# Patient Record
Sex: MALE | Race: WHITE | NOT HISPANIC OR LATINO | URBAN - METROPOLITAN AREA
[De-identification: names, ages, dates, MRNs, and addresses within clinical notes are randomized per-mention and may not be internally consistent; named-entity substitution may affect disease eponyms.]

---

## 2017-01-06 ENCOUNTER — OUTPATIENT (OUTPATIENT)
Dept: OUTPATIENT SERVICES | Facility: HOSPITAL | Age: 47
LOS: 1 days | End: 2017-01-06
Payer: MEDICAID

## 2017-01-06 ENCOUNTER — APPOINTMENT (OUTPATIENT)
Dept: INTERNAL MEDICINE | Facility: CLINIC | Age: 47
End: 2017-01-06

## 2017-01-06 VITALS
SYSTOLIC BLOOD PRESSURE: 96 MMHG | BODY MASS INDEX: 30.8 KG/M2 | WEIGHT: 220 LBS | HEIGHT: 71 IN | DIASTOLIC BLOOD PRESSURE: 70 MMHG

## 2017-01-06 DIAGNOSIS — I10 ESSENTIAL (PRIMARY) HYPERTENSION: ICD-10-CM

## 2017-01-06 DIAGNOSIS — Z98.89 OTHER SPECIFIED POSTPROCEDURAL STATES: Chronic | ICD-10-CM

## 2017-01-06 PROCEDURE — 90670 PCV13 VACCINE IM: CPT

## 2017-01-06 PROCEDURE — G0009: CPT

## 2017-01-06 PROCEDURE — G0463: CPT

## 2017-01-09 DIAGNOSIS — Z23 ENCOUNTER FOR IMMUNIZATION: ICD-10-CM

## 2017-01-09 DIAGNOSIS — Z00.00 ENCOUNTER FOR GENERAL ADULT MEDICAL EXAMINATION WITHOUT ABNORMAL FINDINGS: ICD-10-CM

## 2017-01-20 ENCOUNTER — APPOINTMENT (OUTPATIENT)
Dept: INTERNAL MEDICINE | Facility: CLINIC | Age: 47
End: 2017-01-20

## 2017-01-20 ENCOUNTER — OUTPATIENT (OUTPATIENT)
Dept: OUTPATIENT SERVICES | Facility: HOSPITAL | Age: 47
LOS: 1 days | End: 2017-01-20
Payer: MEDICAID

## 2017-01-20 DIAGNOSIS — Z98.89 OTHER SPECIFIED POSTPROCEDURAL STATES: Chronic | ICD-10-CM

## 2017-01-20 DIAGNOSIS — I10 ESSENTIAL (PRIMARY) HYPERTENSION: ICD-10-CM

## 2017-01-20 PROCEDURE — G0463: CPT

## 2017-01-23 DIAGNOSIS — F17.210 NICOTINE DEPENDENCE, CIGARETTES, UNCOMPLICATED: ICD-10-CM

## 2017-02-06 ENCOUNTER — APPOINTMENT (OUTPATIENT)
Dept: INTERNAL MEDICINE | Facility: CLINIC | Age: 47
End: 2017-02-06

## 2017-04-27 ENCOUNTER — OUTPATIENT (OUTPATIENT)
Dept: OUTPATIENT SERVICES | Facility: HOSPITAL | Age: 47
LOS: 1 days | End: 2017-04-27
Payer: MEDICAID

## 2017-04-27 ENCOUNTER — APPOINTMENT (OUTPATIENT)
Dept: INTERNAL MEDICINE | Facility: CLINIC | Age: 47
End: 2017-04-27

## 2017-04-27 VITALS
HEART RATE: 88 BPM | OXYGEN SATURATION: 95 % | BODY MASS INDEX: 30.24 KG/M2 | HEIGHT: 71 IN | SYSTOLIC BLOOD PRESSURE: 110 MMHG | WEIGHT: 216 LBS | DIASTOLIC BLOOD PRESSURE: 70 MMHG

## 2017-04-27 DIAGNOSIS — I10 ESSENTIAL (PRIMARY) HYPERTENSION: ICD-10-CM

## 2017-04-27 DIAGNOSIS — F41.8 OTHER SPECIFIED ANXIETY DISORDERS: ICD-10-CM

## 2017-04-27 DIAGNOSIS — Z98.89 OTHER SPECIFIED POSTPROCEDURAL STATES: Chronic | ICD-10-CM

## 2017-04-27 PROCEDURE — G0463: CPT

## 2017-05-09 DIAGNOSIS — F41.8 OTHER SPECIFIED ANXIETY DISORDERS: ICD-10-CM

## 2017-05-16 ENCOUNTER — APPOINTMENT (OUTPATIENT)
Dept: CARDIOLOGY | Facility: HOSPITAL | Age: 47
End: 2017-05-16

## 2017-05-16 ENCOUNTER — OUTPATIENT (OUTPATIENT)
Dept: OUTPATIENT SERVICES | Facility: HOSPITAL | Age: 47
LOS: 1 days | End: 2017-05-16
Payer: MEDICAID

## 2017-05-16 VITALS
SYSTOLIC BLOOD PRESSURE: 108 MMHG | HEIGHT: 71 IN | DIASTOLIC BLOOD PRESSURE: 71 MMHG | BODY MASS INDEX: 30.24 KG/M2 | WEIGHT: 216 LBS | HEART RATE: 76 BPM

## 2017-05-16 DIAGNOSIS — I25.10 ATHEROSCLEROTIC HEART DISEASE OF NATIVE CORONARY ARTERY WITHOUT ANGINA PECTORIS: ICD-10-CM

## 2017-05-16 DIAGNOSIS — Z98.89 OTHER SPECIFIED POSTPROCEDURAL STATES: Chronic | ICD-10-CM

## 2017-05-16 PROCEDURE — 93005 ELECTROCARDIOGRAM TRACING: CPT

## 2017-05-16 PROCEDURE — G0463: CPT

## 2017-05-19 DIAGNOSIS — R07.9 CHEST PAIN, UNSPECIFIED: ICD-10-CM

## 2017-05-25 ENCOUNTER — APPOINTMENT (OUTPATIENT)
Dept: CV DIAGNOSTICS | Facility: HOSPITAL | Age: 47
End: 2017-05-25

## 2017-05-25 ENCOUNTER — OUTPATIENT (OUTPATIENT)
Dept: OUTPATIENT SERVICES | Facility: HOSPITAL | Age: 47
LOS: 1 days | End: 2017-05-25
Payer: MEDICAID

## 2017-05-25 DIAGNOSIS — R07.9 CHEST PAIN, UNSPECIFIED: ICD-10-CM

## 2017-05-25 DIAGNOSIS — Z98.89 OTHER SPECIFIED POSTPROCEDURAL STATES: Chronic | ICD-10-CM

## 2017-05-25 DIAGNOSIS — I25.10 ATHEROSCLEROTIC HEART DISEASE OF NATIVE CORONARY ARTERY WITHOUT ANGINA PECTORIS: ICD-10-CM

## 2017-05-25 PROCEDURE — A9500: CPT

## 2017-05-25 PROCEDURE — 78452 HT MUSCLE IMAGE SPECT MULT: CPT | Mod: 26

## 2017-05-25 PROCEDURE — 93017 CV STRESS TEST TRACING ONLY: CPT

## 2017-05-25 PROCEDURE — 93018 CV STRESS TEST I&R ONLY: CPT

## 2017-05-25 PROCEDURE — 78452 HT MUSCLE IMAGE SPECT MULT: CPT

## 2017-05-25 PROCEDURE — 93016 CV STRESS TEST SUPVJ ONLY: CPT

## 2017-06-01 ENCOUNTER — APPOINTMENT (OUTPATIENT)
Dept: INTERNAL MEDICINE | Facility: CLINIC | Age: 47
End: 2017-06-01

## 2017-10-09 ENCOUNTER — LABORATORY RESULT (OUTPATIENT)
Age: 47
End: 2017-10-09

## 2017-10-09 ENCOUNTER — OUTPATIENT (OUTPATIENT)
Dept: OUTPATIENT SERVICES | Facility: HOSPITAL | Age: 47
LOS: 1 days | End: 2017-10-09
Payer: MEDICAID

## 2017-10-09 ENCOUNTER — APPOINTMENT (OUTPATIENT)
Dept: INTERNAL MEDICINE | Facility: CLINIC | Age: 47
End: 2017-10-09
Payer: MEDICAID

## 2017-10-09 VITALS
HEIGHT: 71 IN | OXYGEN SATURATION: 97 % | SYSTOLIC BLOOD PRESSURE: 126 MMHG | HEART RATE: 65 BPM | DIASTOLIC BLOOD PRESSURE: 86 MMHG

## 2017-10-09 VITALS — DIASTOLIC BLOOD PRESSURE: 86 MMHG | OXYGEN SATURATION: 97 % | SYSTOLIC BLOOD PRESSURE: 126 MMHG | HEART RATE: 65 BPM

## 2017-10-09 DIAGNOSIS — B35.3 TINEA PEDIS: ICD-10-CM

## 2017-10-09 DIAGNOSIS — I10 ESSENTIAL (PRIMARY) HYPERTENSION: ICD-10-CM

## 2017-10-09 DIAGNOSIS — Z98.89 OTHER SPECIFIED POSTPROCEDURAL STATES: Chronic | ICD-10-CM

## 2017-10-09 PROCEDURE — 83036 HEMOGLOBIN GLYCOSYLATED A1C: CPT

## 2017-10-09 PROCEDURE — 80053 COMPREHEN METABOLIC PANEL: CPT

## 2017-10-09 PROCEDURE — 99396 PREV VISIT EST AGE 40-64: CPT | Mod: GC

## 2017-10-09 PROCEDURE — 90688 IIV4 VACCINE SPLT 0.5 ML IM: CPT

## 2017-10-09 PROCEDURE — G0463: CPT

## 2017-10-09 PROCEDURE — 85027 COMPLETE CBC AUTOMATED: CPT

## 2017-10-09 PROCEDURE — G0008: CPT

## 2017-10-09 RX ORDER — NICOTINE POLACRILEX 4 MG/1
4 GUM, CHEWING ORAL
Qty: 2 | Refills: 0 | Status: DISCONTINUED | COMMUNITY
Start: 2017-01-06 | End: 2017-10-09

## 2017-10-10 LAB
ALBUMIN SERPL ELPH-MCNC: 4.3 G/DL — SIGNIFICANT CHANGE UP (ref 3.3–5)
ALP SERPL-CCNC: 55 U/L — SIGNIFICANT CHANGE UP (ref 40–120)
ALT FLD-CCNC: 49 U/L — HIGH (ref 10–45)
ANION GAP SERPL CALC-SCNC: 12 MMOL/L — SIGNIFICANT CHANGE UP (ref 5–17)
AST SERPL-CCNC: 26 U/L — SIGNIFICANT CHANGE UP (ref 10–40)
BILIRUB SERPL-MCNC: 0.5 MG/DL — SIGNIFICANT CHANGE UP (ref 0.2–1.2)
BUN SERPL-MCNC: 14 MG/DL — SIGNIFICANT CHANGE UP (ref 7–23)
CALCIUM SERPL-MCNC: 9.2 MG/DL — SIGNIFICANT CHANGE UP (ref 8.4–10.5)
CHLORIDE SERPL-SCNC: 103 MMOL/L — SIGNIFICANT CHANGE UP (ref 96–108)
CHOLEST SERPL-MCNC: 108 MG/DL
CHOLEST/HDLC SERPL: 3.6 RATIO
CO2 SERPL-SCNC: 30 MMOL/L — SIGNIFICANT CHANGE UP (ref 22–31)
CREAT SERPL-MCNC: 0.97 MG/DL — SIGNIFICANT CHANGE UP (ref 0.5–1.3)
GLUCOSE SERPL-MCNC: 98 MG/DL — SIGNIFICANT CHANGE UP (ref 70–99)
HCT VFR BLD CALC: 41.2 % — SIGNIFICANT CHANGE UP (ref 39–50)
HDLC SERPL-MCNC: 30 MG/DL
HGB BLD-MCNC: 13.5 G/DL — SIGNIFICANT CHANGE UP (ref 13–17)
LDLC SERPL CALC-MCNC: 33 MG/DL
MCHC RBC-ENTMCNC: 30.6 PG — SIGNIFICANT CHANGE UP (ref 27–34)
MCHC RBC-ENTMCNC: 32.8 GM/DL — SIGNIFICANT CHANGE UP (ref 32–36)
MCV RBC AUTO: 93.4 FL — SIGNIFICANT CHANGE UP (ref 80–100)
PLATELET # BLD AUTO: 234 K/UL — SIGNIFICANT CHANGE UP (ref 150–400)
POTASSIUM SERPL-MCNC: 4.5 MMOL/L — SIGNIFICANT CHANGE UP (ref 3.5–5.3)
POTASSIUM SERPL-SCNC: 4.5 MMOL/L — SIGNIFICANT CHANGE UP (ref 3.5–5.3)
PROT SERPL-MCNC: 7.7 G/DL — SIGNIFICANT CHANGE UP (ref 6–8.3)
RBC # BLD: 4.41 M/UL — SIGNIFICANT CHANGE UP (ref 4.2–5.8)
RBC # FLD: 13.9 % — SIGNIFICANT CHANGE UP (ref 10.3–14.5)
SODIUM SERPL-SCNC: 145 MMOL/L — SIGNIFICANT CHANGE UP (ref 135–145)
TRIGL SERPL-MCNC: 225 MG/DL
WBC # BLD: 7.39 K/UL — SIGNIFICANT CHANGE UP (ref 3.8–10.5)
WBC # FLD AUTO: 7.39 K/UL — SIGNIFICANT CHANGE UP (ref 3.8–10.5)

## 2017-10-12 DIAGNOSIS — R73.03 PREDIABETES: ICD-10-CM

## 2017-10-12 DIAGNOSIS — B35.3 TINEA PEDIS: ICD-10-CM

## 2017-10-20 ENCOUNTER — APPOINTMENT (OUTPATIENT)
Dept: DERMATOLOGY | Facility: CLINIC | Age: 47
End: 2017-10-20
Payer: MEDICAID

## 2017-10-20 ENCOUNTER — LABORATORY RESULT (OUTPATIENT)
Age: 47
End: 2017-10-20

## 2017-10-20 VITALS — SYSTOLIC BLOOD PRESSURE: 128 MMHG | DIASTOLIC BLOOD PRESSURE: 80 MMHG

## 2017-10-20 DIAGNOSIS — Z86.79 PERSONAL HISTORY OF OTHER DISEASES OF THE CIRCULATORY SYSTEM: ICD-10-CM

## 2017-10-20 DIAGNOSIS — Z91.89 OTHER SPECIFIED PERSONAL RISK FACTORS, NOT ELSEWHERE CLASSIFIED: ICD-10-CM

## 2017-10-20 PROCEDURE — 99203 OFFICE O/P NEW LOW 30 MIN: CPT | Mod: GC

## 2017-10-23 ENCOUNTER — LABORATORY RESULT (OUTPATIENT)
Age: 47
End: 2017-10-23

## 2017-10-24 ENCOUNTER — OUTPATIENT (OUTPATIENT)
Dept: OUTPATIENT SERVICES | Facility: HOSPITAL | Age: 47
LOS: 1 days | End: 2017-10-24
Payer: MEDICAID

## 2017-10-24 ENCOUNTER — APPOINTMENT (OUTPATIENT)
Dept: INTERNAL MEDICINE | Facility: CLINIC | Age: 47
End: 2017-10-24
Payer: MEDICAID

## 2017-10-24 DIAGNOSIS — L40.9 PSORIASIS, UNSPECIFIED: ICD-10-CM

## 2017-10-24 DIAGNOSIS — I10 ESSENTIAL (PRIMARY) HYPERTENSION: ICD-10-CM

## 2017-10-24 DIAGNOSIS — Z98.89 OTHER SPECIFIED POSTPROCEDURAL STATES: Chronic | ICD-10-CM

## 2017-10-24 LAB — HBA1C MFR BLD HPLC: 5.9

## 2017-10-24 PROCEDURE — G0463: CPT

## 2017-10-24 PROCEDURE — 99213 OFFICE O/P EST LOW 20 MIN: CPT | Mod: GE

## 2017-10-25 RX ORDER — KETOCONAZOLE 20 MG/G
2 CREAM TOPICAL TWICE DAILY
Qty: 60 | Refills: 0 | Status: DISCONTINUED | COMMUNITY
Start: 2017-10-09 | End: 2017-10-25

## 2017-11-24 ENCOUNTER — APPOINTMENT (OUTPATIENT)
Dept: DERMATOLOGY | Facility: CLINIC | Age: 47
End: 2017-11-24
Payer: MEDICAID

## 2017-11-24 VITALS — SYSTOLIC BLOOD PRESSURE: 120 MMHG | DIASTOLIC BLOOD PRESSURE: 70 MMHG

## 2017-11-24 PROCEDURE — 99213 OFFICE O/P EST LOW 20 MIN: CPT | Mod: GC

## 2017-11-24 RX ORDER — TRAZODONE HYDROCHLORIDE 100 MG/1
100 TABLET ORAL
Qty: 30 | Refills: 0 | Status: ACTIVE | COMMUNITY
Start: 2017-04-04

## 2017-12-18 ENCOUNTER — APPOINTMENT (OUTPATIENT)
Dept: INTERNAL MEDICINE | Facility: CLINIC | Age: 47
End: 2017-12-18

## 2018-01-16 ENCOUNTER — NON-APPOINTMENT (OUTPATIENT)
Age: 48
End: 2018-01-16

## 2018-01-16 ENCOUNTER — APPOINTMENT (OUTPATIENT)
Dept: CARDIOLOGY | Facility: HOSPITAL | Age: 48
End: 2018-01-16

## 2018-01-16 ENCOUNTER — OUTPATIENT (OUTPATIENT)
Dept: OUTPATIENT SERVICES | Facility: HOSPITAL | Age: 48
LOS: 1 days | End: 2018-01-16
Payer: MEDICAID

## 2018-01-16 VITALS — HEART RATE: 99 BPM | DIASTOLIC BLOOD PRESSURE: 80 MMHG | OXYGEN SATURATION: 95 % | SYSTOLIC BLOOD PRESSURE: 110 MMHG

## 2018-01-16 DIAGNOSIS — I25.10 ATHEROSCLEROTIC HEART DISEASE OF NATIVE CORONARY ARTERY WITHOUT ANGINA PECTORIS: ICD-10-CM

## 2018-01-16 DIAGNOSIS — Z98.89 OTHER SPECIFIED POSTPROCEDURAL STATES: Chronic | ICD-10-CM

## 2018-01-16 PROCEDURE — G0463: CPT

## 2018-07-17 ENCOUNTER — OUTPATIENT (OUTPATIENT)
Dept: OUTPATIENT SERVICES | Facility: HOSPITAL | Age: 48
LOS: 1 days | End: 2018-07-17
Payer: MEDICAID

## 2018-07-17 ENCOUNTER — APPOINTMENT (OUTPATIENT)
Dept: CARDIOLOGY | Facility: HOSPITAL | Age: 48
End: 2018-07-17

## 2018-07-17 ENCOUNTER — NON-APPOINTMENT (OUTPATIENT)
Age: 48
End: 2018-07-17

## 2018-07-17 VITALS
BODY MASS INDEX: 30.1 KG/M2 | HEART RATE: 73 BPM | DIASTOLIC BLOOD PRESSURE: 73 MMHG | SYSTOLIC BLOOD PRESSURE: 110 MMHG | HEIGHT: 71 IN | WEIGHT: 215 LBS | OXYGEN SATURATION: 97 %

## 2018-07-17 DIAGNOSIS — Z98.89 OTHER SPECIFIED POSTPROCEDURAL STATES: Chronic | ICD-10-CM

## 2018-07-17 DIAGNOSIS — I25.10 ATHEROSCLEROTIC HEART DISEASE OF NATIVE CORONARY ARTERY WITHOUT ANGINA PECTORIS: ICD-10-CM

## 2018-07-17 DIAGNOSIS — R01.1 CARDIAC MURMUR, UNSPECIFIED: ICD-10-CM

## 2018-07-17 PROCEDURE — 93005 ELECTROCARDIOGRAM TRACING: CPT

## 2018-07-17 PROCEDURE — G0463: CPT

## 2018-07-22 PROBLEM — R01.1 MURMUR: Status: ACTIVE | Noted: 2018-07-17

## 2018-07-23 DIAGNOSIS — R01.1 CARDIAC MURMUR, UNSPECIFIED: ICD-10-CM

## 2018-08-07 ENCOUNTER — OUTPATIENT (OUTPATIENT)
Dept: OUTPATIENT SERVICES | Facility: HOSPITAL | Age: 48
LOS: 1 days | End: 2018-08-07
Payer: MEDICAID

## 2018-08-07 ENCOUNTER — APPOINTMENT (OUTPATIENT)
Dept: CV DIAGNOSITCS | Facility: HOSPITAL | Age: 48
End: 2018-08-07

## 2018-08-07 DIAGNOSIS — Z98.89 OTHER SPECIFIED POSTPROCEDURAL STATES: Chronic | ICD-10-CM

## 2018-08-07 DIAGNOSIS — I25.10 ATHEROSCLEROTIC HEART DISEASE OF NATIVE CORONARY ARTERY WITHOUT ANGINA PECTORIS: ICD-10-CM

## 2018-08-07 DIAGNOSIS — R01.1 CARDIAC MURMUR, UNSPECIFIED: ICD-10-CM

## 2018-08-07 PROCEDURE — 93306 TTE W/DOPPLER COMPLETE: CPT

## 2018-08-07 PROCEDURE — 93306 TTE W/DOPPLER COMPLETE: CPT | Mod: 26

## 2018-09-18 ENCOUNTER — APPOINTMENT (OUTPATIENT)
Dept: CARDIOLOGY | Facility: HOSPITAL | Age: 48
End: 2018-09-18

## 2019-01-28 ENCOUNTER — APPOINTMENT (OUTPATIENT)
Dept: INTERNAL MEDICINE | Facility: CLINIC | Age: 49
End: 2019-01-28

## 2019-02-12 ENCOUNTER — NON-APPOINTMENT (OUTPATIENT)
Age: 49
End: 2019-02-12

## 2019-02-12 ENCOUNTER — OUTPATIENT (OUTPATIENT)
Dept: OUTPATIENT SERVICES | Facility: HOSPITAL | Age: 49
LOS: 1 days | End: 2019-02-12
Payer: MEDICAID

## 2019-02-12 ENCOUNTER — APPOINTMENT (OUTPATIENT)
Dept: CARDIOLOGY | Facility: HOSPITAL | Age: 49
End: 2019-02-12

## 2019-02-12 VITALS
OXYGEN SATURATION: 96 % | HEIGHT: 71 IN | SYSTOLIC BLOOD PRESSURE: 119 MMHG | HEART RATE: 77 BPM | BODY MASS INDEX: 32.2 KG/M2 | WEIGHT: 230 LBS | DIASTOLIC BLOOD PRESSURE: 73 MMHG

## 2019-02-12 DIAGNOSIS — I25.10 ATHEROSCLEROTIC HEART DISEASE OF NATIVE CORONARY ARTERY WITHOUT ANGINA PECTORIS: ICD-10-CM

## 2019-02-12 DIAGNOSIS — Z98.89 OTHER SPECIFIED POSTPROCEDURAL STATES: Chronic | ICD-10-CM

## 2019-02-12 PROCEDURE — 93005 ELECTROCARDIOGRAM TRACING: CPT

## 2019-02-12 PROCEDURE — G0463: CPT

## 2019-02-13 NOTE — REVIEW OF SYSTEMS
[Feeling Fatigued] : not feeling fatigued [see HPI] : see HPI [Chest Pain] : chest pain [Negative] : Heme/Lymph

## 2019-02-13 NOTE — HISTORY OF PRESENT ILLNESS
[FreeTextEntry1] : 47 M with CAD s/p PCI to LCx for anginal symptoms on 2014 which recurred in 2016 and was re-stented (stent within a stent), actively smoking presenting for follow up visit. Denies any complaints - no chest pain, orthopea, PND, palpitations.\par \par Has stopped smoking -- currently using patch. Hasn't smoked in the past 3-4 months. Goes to the gym and lifts weights. Works doing tile and construction. No chest pain or chest discomfort since our last visit.

## 2019-02-13 NOTE — REASON FOR VISIT
[Follow-Up - Clinic] : a clinic follow-up of [Chest Pain] : chest pain [Coronary Artery Disease] : coronary artery disease [Hypertension] : hypertension [Medication Management] : Medication management [Prior Myocardial Infarction] : a prior myocardial infarction [FreeTextEntry1] : Smoking Cessation counseling

## 2019-02-13 NOTE — PHYSICAL EXAM
[General Appearance - Well Developed] : well developed [Normal Appearance] : normal appearance [Well Groomed] : well groomed [General Appearance - Well Nourished] : well nourished [No Deformities] : no deformities [General Appearance - In No Acute Distress] : no acute distress [Normal Conjunctiva] : the conjunctiva exhibited no abnormalities [Eyelids - No Xanthelasma] : the eyelids demonstrated no xanthelasmas [Normal Oral Mucosa] : normal oral mucosa [No Oral Pallor] : no oral pallor [No Oral Cyanosis] : no oral cyanosis [Normal Jugular Venous A Waves Present] : normal jugular venous A waves present [Normal Jugular Venous V Waves Present] : normal jugular venous V waves present [No Jugular Venous Wild A Waves] : no jugular venous wild A waves [Respiration, Rhythm And Depth] : normal respiratory rhythm and effort [Exaggerated Use Of Accessory Muscles For Inspiration] : no accessory muscle use [Auscultation Breath Sounds / Voice Sounds] : lungs were clear to auscultation bilaterally [Heart Rate And Rhythm] : heart rate and rhythm were normal [Heart Sounds] : normal S1 and S2 [Systolic grade ___/6] : A grade [unfilled]/6 systolic murmur was heard. [Abdomen Soft] : soft [Abdomen Tenderness] : non-tender [Abdomen Mass (___ Cm)] : no abdominal mass palpated [Abnormal Walk] : normal gait [Gait - Sufficient For Exercise Testing] : the gait was sufficient for exercise testing [Nail Clubbing] : no clubbing of the fingernails [Cyanosis, Localized] : no localized cyanosis [Petechial Hemorrhages (___cm)] : no petechial hemorrhages [Skin Color & Pigmentation] : normal skin color and pigmentation [] : no rash [No Venous Stasis] : no venous stasis [Skin Lesions] : no skin lesions [No Skin Ulcers] : no skin ulcer [No Xanthoma] : no  xanthoma was observed [Oriented To Time, Place, And Person] : oriented to person, place, and time

## 2019-03-06 ENCOUNTER — LABORATORY RESULT (OUTPATIENT)
Age: 49
End: 2019-03-06

## 2019-03-06 ENCOUNTER — APPOINTMENT (OUTPATIENT)
Dept: INTERNAL MEDICINE | Facility: CLINIC | Age: 49
End: 2019-03-06
Payer: MEDICAID

## 2019-03-06 ENCOUNTER — OUTPATIENT (OUTPATIENT)
Dept: OUTPATIENT SERVICES | Facility: HOSPITAL | Age: 49
LOS: 1 days | End: 2019-03-06
Payer: MEDICAID

## 2019-03-06 VITALS
HEART RATE: 87 BPM | HEIGHT: 71 IN | SYSTOLIC BLOOD PRESSURE: 118 MMHG | OXYGEN SATURATION: 96 % | DIASTOLIC BLOOD PRESSURE: 80 MMHG | BODY MASS INDEX: 32.48 KG/M2 | WEIGHT: 232 LBS

## 2019-03-06 DIAGNOSIS — F17.200 NICOTINE DEPENDENCE, UNSPECIFIED, UNCOMPLICATED: ICD-10-CM

## 2019-03-06 DIAGNOSIS — Z98.89 OTHER SPECIFIED POSTPROCEDURAL STATES: Chronic | ICD-10-CM

## 2019-03-06 DIAGNOSIS — I10 ESSENTIAL (PRIMARY) HYPERTENSION: ICD-10-CM

## 2019-03-06 PROCEDURE — 99213 OFFICE O/P EST LOW 20 MIN: CPT | Mod: GE

## 2019-03-07 RX ORDER — NYSTATIN AND TRIAMCINOLONE ACETONIDE 100000; 1 [USP'U]/G; MG/G
100000-0.1 OINTMENT TOPICAL 3 TIMES DAILY
Qty: 84 | Refills: 0 | Status: DISCONTINUED | COMMUNITY
Start: 2017-10-09 | End: 2019-03-07

## 2019-03-07 RX ORDER — CLINDAMYCIN PHOSPHATE 10 MG/ML
1 LOTION TOPICAL DAILY
Qty: 1 | Refills: 3 | Status: DISCONTINUED | COMMUNITY
Start: 2017-10-23 | End: 2019-03-07

## 2019-03-07 RX ORDER — HALOBETASOL PROPIONATE 0.5 MG/G
0.05 OINTMENT TOPICAL TWICE DAILY
Qty: 2 | Refills: 3 | Status: DISCONTINUED | COMMUNITY
Start: 2017-10-20 | End: 2019-03-07

## 2019-03-07 NOTE — PHYSICAL EXAM
[No Acute Distress] : no acute distress [Well Nourished] : well nourished [Well Developed] : well developed [Normal Sclera/Conjunctiva] : normal sclera/conjunctiva [PERRL] : pupils equal round and reactive to light [EOMI] : extraocular movements intact [Normal Oropharynx] : the oropharynx was normal [Supple] : supple [No Lymphadenopathy] : no lymphadenopathy [No Respiratory Distress] : no respiratory distress  [Clear to Auscultation] : lungs were clear to auscultation bilaterally [Normal Rate] : normal rate  [Regular Rhythm] : with a regular rhythm [Normal S1, S2] : normal S1 and S2 [No Murmur] : no murmur heard [No Edema] : there was no peripheral edema [Soft] : abdomen soft [Non Tender] : non-tender [Non-distended] : non-distended [Normal Bowel Sounds] : normal bowel sounds [Normal Anterior Cervical Nodes] : no anterior cervical lymphadenopathy [No Spinal Tenderness] : no spinal tenderness [Grossly Normal Strength/Tone] : grossly normal strength/tone [Normal Gait] : normal gait [Coordination Grossly Intact] : coordination grossly intact [Alert and Oriented x3] : oriented to person, place, and time [Normal Insight/Judgement] : insight and judgment were intact

## 2019-03-08 NOTE — HEALTH RISK ASSESSMENT
[Good] : ~his/her~  mood as  good [No falls in past year] : Patient reported no falls in the past year [0] : 2) Feeling down, depressed, or hopeless: Not at all (0) [] : No [de-identified] : goes to the 3-4 times a week [[id number]] : 0

## 2019-03-08 NOTE — HISTORY OF PRESENT ILLNESS
[de-identified] : 49 M PMHx CAD s/p Stent Lcx c/b In Stent Thrombosis 6/2016 s/p IVELISSE, HTN, HLD, PreDM, Bipolar disorder, Anxiety presents for CPE\par \par Pt without complaints today. Following with cards. Stable disease, recent nuclear stress showed some defects but with preserved, EF 54%. Continues with aspirin, metoprolol and high dose statin.\par \par Stopped smoking about 2-3 months ago after joining the [facility] smoking cessation.program. Has noticed some weight gain after he quit but also attributes this to diet and being more sedentary in the winter. States he eats mostly meat, burgers, tried to have less carbs.\par Follows up with psychiatry for bipolar and anxiety. Currently well controlled on trazodone and olanzapine. \par Lives with mother. Single, no children.\par Sexually active with women in the past. Uses condoms. History of STD about 20 years ago.\par \par 3-4 times a week goes to the gym\par Requesting we fax over lab work to psychiatrist Dr. Pillai, Fax: [phone].\par \par

## 2019-03-08 NOTE — ASSESSMENT
[FreeTextEntry1] : 49 M PMHx CAD s/p Stent Lcx c/b In Stent Thrombosis 6/2016 s/p IVELISSE, HTN, HLD, PreDM, Bipolar disorder, Anxiety presents for CPE\par \par #CAD s/p stent\par -No chest pain or changes in exercise tolerance\par -Continue with aspirin, metoprolol and high intensity statin \par -Continue to follow with cardiology\par \par #Pre-diabetes\par -Counseled on dietary and lifestyle changes\par --Follow up [id number]\par \par #Bipolar disorder\par -Continue with olanzapine and trazodone as per psychiatry\par \par HCM: \par Detailed discussion regarding colonoscopy vs FIT testing, pt prefers FIT testing for now\par \par Flu vaccine deferred\par \par Discussed with Dr. Jean \par RTC in 3 months

## 2019-03-10 ENCOUNTER — LABORATORY RESULT (OUTPATIENT)
Age: 49
End: 2019-03-10

## 2019-03-11 PROCEDURE — G0463: CPT

## 2019-03-11 PROCEDURE — 82274 ASSAY TEST FOR BLOOD FECAL: CPT

## 2019-03-12 DIAGNOSIS — I25.10 ATHEROSCLEROTIC HEART DISEASE OF NATIVE CORONARY ARTERY WITHOUT ANGINA PECTORIS: ICD-10-CM

## 2019-03-12 DIAGNOSIS — E78.5 HYPERLIPIDEMIA, UNSPECIFIED: ICD-10-CM

## 2019-03-12 LAB — OB PNL STL IA: NEGATIVE — SIGNIFICANT CHANGE UP

## 2019-03-13 LAB
ALBUMIN SERPL ELPH-MCNC: 4.5 G/DL
ALP BLD-CCNC: 65 U/L
ALT SERPL-CCNC: 61 U/L
ANION GAP SERPL CALC-SCNC: 14 MMOL/L
AST SERPL-CCNC: 28 U/L
BASOPHILS # BLD AUTO: 0.03 K/UL
BASOPHILS NFR BLD AUTO: 0.4 %
BILIRUB SERPL-MCNC: 0.3 MG/DL
BUN SERPL-MCNC: 12 MG/DL
CALCIUM SERPL-MCNC: 9.6 MG/DL
CHLORIDE SERPL-SCNC: 102 MMOL/L
CHOLEST SERPL-MCNC: 110 MG/DL
CHOLEST/HDLC SERPL: 3.1 RATIO
CO2 SERPL-SCNC: 27 MMOL/L
CREAT SERPL-MCNC: 1.08 MG/DL
EOSINOPHIL # BLD AUTO: 0.23 K/UL
EOSINOPHIL NFR BLD AUTO: 2.9 %
GLUCOSE SERPL-MCNC: 59 MG/DL
HBA1C MFR BLD HPLC: 6 %
HCT VFR BLD CALC: 42.6 %
HDLC SERPL-MCNC: 35 MG/DL
HGB BLD-MCNC: 14.1 G/DL
HIV1+2 AB SPEC QL IA.RAPID: NONREACTIVE
IMM GRANULOCYTES NFR BLD AUTO: 0.1 %
LDLC SERPL CALC-MCNC: 39 MG/DL
LYMPHOCYTES # BLD AUTO: 2.61 K/UL
LYMPHOCYTES NFR BLD AUTO: 32.5 %
MAN DIFF?: NORMAL
MCHC RBC-ENTMCNC: 30.3 PG
MCHC RBC-ENTMCNC: 33.1 GM/DL
MCV RBC AUTO: 91.6 FL
MONOCYTES # BLD AUTO: 1.51 K/UL
MONOCYTES NFR BLD AUTO: 18.8 %
NEUTROPHILS # BLD AUTO: 3.65 K/UL
NEUTROPHILS NFR BLD AUTO: 45.3 %
PLATELET # BLD AUTO: 253 K/UL
POTASSIUM SERPL-SCNC: 4.9 MMOL/L
PROT SERPL-MCNC: 7.5 G/DL
RBC # BLD: 4.65 M/UL
RBC # FLD: 13.5 %
SODIUM SERPL-SCNC: 143 MMOL/L
TRIGL SERPL-MCNC: 181 MG/DL
WBC # FLD AUTO: 8.04 K/UL

## 2019-04-22 ENCOUNTER — MEDICATION RENEWAL (OUTPATIENT)
Age: 49
End: 2019-04-22

## 2019-07-16 NOTE — DISCUSSION/SUMMARY
[Coronary Artery Disease] : coronary artery disease [Stable] : stable [None] : none [Patient] : the patient [___ Month(s)] : [unfilled] month(s) [With Me] : with me [FreeTextEntry1] : 47 M with HLD, HTN and CAD s/p stent to LCx (2014) c/b instent restenosis (2016) and describes the quality of symptoms as similar at that time.\par \par #CAD - stable; recent nuclear stress showed some defects with preserved LV systolic function (EF 54%)\par - on aspirin, beta blocker, statin\par \par #Mild aortic stenosis -- confirmed on echo\par - monitor clinically \par \par #Smoking cessation -- continue with patch\par  WDL

## 2019-07-19 ENCOUNTER — OUTPATIENT (OUTPATIENT)
Dept: OUTPATIENT SERVICES | Facility: HOSPITAL | Age: 49
LOS: 1 days | End: 2019-07-19
Payer: MEDICAID

## 2019-07-19 ENCOUNTER — LABORATORY RESULT (OUTPATIENT)
Age: 49
End: 2019-07-19

## 2019-07-19 ENCOUNTER — APPOINTMENT (OUTPATIENT)
Dept: INTERNAL MEDICINE | Facility: CLINIC | Age: 49
End: 2019-07-19
Payer: MEDICAID

## 2019-07-19 VITALS
OXYGEN SATURATION: 96 % | BODY MASS INDEX: 31.92 KG/M2 | WEIGHT: 228 LBS | DIASTOLIC BLOOD PRESSURE: 60 MMHG | SYSTOLIC BLOOD PRESSURE: 110 MMHG | HEART RATE: 83 BPM | HEIGHT: 71 IN

## 2019-07-19 VITALS — TEMPERATURE: 96.7 F

## 2019-07-19 DIAGNOSIS — R21 RASH AND OTHER NONSPECIFIC SKIN ERUPTION: ICD-10-CM

## 2019-07-19 DIAGNOSIS — I10 ESSENTIAL (PRIMARY) HYPERTENSION: ICD-10-CM

## 2019-07-19 DIAGNOSIS — Z98.89 OTHER SPECIFIED POSTPROCEDURAL STATES: Chronic | ICD-10-CM

## 2019-07-19 PROCEDURE — 87252 VIRUS INOCULATION TISSUE: CPT

## 2019-07-19 PROCEDURE — 99213 OFFICE O/P EST LOW 20 MIN: CPT | Mod: GE

## 2019-07-19 PROCEDURE — 87070 CULTURE OTHR SPECIMN AEROBIC: CPT

## 2019-07-19 PROCEDURE — G0463: CPT

## 2019-07-19 RX ORDER — OLANZAPINE 5 MG/1
5 TABLET ORAL DAILY
Refills: 0 | Status: ACTIVE | COMMUNITY
Start: 2017-01-20

## 2019-07-21 LAB
CULTURE RESULTS: SIGNIFICANT CHANGE UP
SPECIMEN SOURCE: SIGNIFICANT CHANGE UP

## 2019-07-23 ENCOUNTER — APPOINTMENT (OUTPATIENT)
Dept: DERMATOLOGY | Facility: CLINIC | Age: 49
End: 2019-07-23
Payer: MEDICAID

## 2019-07-23 ENCOUNTER — LABORATORY RESULT (OUTPATIENT)
Age: 49
End: 2019-07-23

## 2019-07-23 VITALS — DIASTOLIC BLOOD PRESSURE: 70 MMHG | SYSTOLIC BLOOD PRESSURE: 114 MMHG

## 2019-07-23 DIAGNOSIS — L30.9 DERMATITIS, UNSPECIFIED: ICD-10-CM

## 2019-07-23 PROCEDURE — 99214 OFFICE O/P EST MOD 30 MIN: CPT

## 2019-07-25 ENCOUNTER — CHART COPY (OUTPATIENT)
Age: 49
End: 2019-07-25

## 2019-07-31 LAB — VIRUS SPEC CULT: SIGNIFICANT CHANGE UP

## 2019-08-29 NOTE — ASSESSMENT
[FreeTextEntry1] : 49-yo M w/ PMH of CAD s/p LCX stent c/b in-stent thrombosis (June 2016) s/p IVELISSE, HTN, HLD, prediabetes, bipolar d/o, and anxiety, presenting for skin rash.\par \par #Skin rash\par - Numerous firm, erythematous, nonpruritic, nontender, raised, 1-cm rash.\par - Not following dermatomal distribution. Locally concentrated at mid-back as well as R olecranon.\par - Contact-related vs. inflammatory vs. insect bite.\par - Very small amount of clear fluid expressed. Viral and bacterial Cx sent.\par - Derm referral provided for punch biopsy\par \par #HCM\par - FOBT neg\par - Last CPE Mar 2019\par - [id number] Jan 2017\par - Tdap Jan 2013\par - RTC 5 weeks\par \par Discussed with Dr. Frazier\par

## 2019-08-29 NOTE — HISTORY OF PRESENT ILLNESS
[FreeTextEntry8] : 49-yo M w/ PMH of CAD s/p LCX stent c/b in-stent thrombosis (June 2016) s/p IVELISSE, HTN, HLD, prediabetes, bipolar d/o, and anxiety, presenting for skin rash. Patient noticed 3-4 raised, nonpruritic raised lesions in the back, which grew in number with time. Unsure if they were pustular or oozing clear fluid. No recent illness, sick contacts, or new animal contacts. Patient has a parrot for 25 years. Patient has been living in the same place for over 35 years in [place]. Patient thinks he saw a spider living in his apartment. Unsure of patient was bitten by a bug. Patient works in the construction. Recently working mostly indoors. No new cloths, detergents, soaps, or food. No trauma history.

## 2019-08-29 NOTE — PHYSICAL EXAM
[No Acute Distress] : no acute distress [Well Nourished] : well nourished [Well Developed] : well developed [Well-Appearing] : well-appearing [Normal Sclera/Conjunctiva] : normal sclera/conjunctiva [PERRL] : pupils equal round and reactive to light [EOMI] : extraocular movements intact [Normal Outer Ear/Nose] : the outer ears and nose were normal in appearance [Normal Oropharynx] : the oropharynx was normal [No Lymphadenopathy] : no lymphadenopathy [Supple] : supple [No Respiratory Distress] : no respiratory distress  [No Accessory Muscle Use] : no accessory muscle use [Clear to Auscultation] : lungs were clear to auscultation bilaterally [Normal Rate] : normal rate  [Regular Rhythm] : with a regular rhythm [Normal S1, S2] : normal S1 and S2 [No Murmur] : no murmur heard [No Edema] : there was no peripheral edema [Soft] : abdomen soft [Non Tender] : non-tender [Non-distended] : non-distended [Normal Bowel Sounds] : normal bowel sounds [Normal Posterior Cervical Nodes] : no posterior cervical lymphadenopathy [Normal Anterior Cervical Nodes] : no anterior cervical lymphadenopathy [No CVA Tenderness] : no CVA  tenderness [No Spinal Tenderness] : no spinal tenderness [No Joint Swelling] : no joint swelling [Grossly Normal Strength/Tone] : grossly normal strength/tone [Coordination Grossly Intact] : coordination grossly intact [No Focal Deficits] : no focal deficits [Normal Gait] : normal gait [Normal Affect] : the affect was normal [Normal Insight/Judgement] : insight and judgment were intact [de-identified] : Multiple nontender, firm, erythematous, nonpruritic, blanching, and raised lesions of 1-cm to 2-cm size; some of the lesions are vesicular; one 1-cm skin lesion of same quality over R olecranon [de-identified] : S

## 2019-08-29 NOTE — REVIEW OF SYSTEMS
[Skin Rash] : skin rash [Fever] : no fever [Chills] : no chills [Fatigue] : no fatigue [Night Sweats] : no night sweats [Discharge] : no discharge [Pain] : no pain [Earache] : no earache [Hearing Loss] : no hearing loss [Nosebleeds] : no nosebleeds [Nasal Discharge] : no nasal discharge [Chest Pain] : no chest pain [Palpitations] : no palpitations [Orthopena] : no orthopnea [Shortness Of Breath] : no shortness of breath [Wheezing] : no wheezing [Cough] : no cough [Dyspnea on Exertion] : not dyspnea on exertion [Abdominal Pain] : no abdominal pain [Nausea] : no nausea [Constipation] : no constipation [Vomiting] : no vomiting [Diarrhea] : no diarrhea [Dysuria] : no dysuria [Joint Pain] : no joint pain [Joint Stiffness] : no joint stiffness [Headache] : no headache [Dizziness] : no dizziness [Anxiety] : no anxiety [de-identified] : Nontender, nonpruritic skin rash in the back [Easy Bruising] : no easy bruising

## 2019-09-03 ENCOUNTER — APPOINTMENT (OUTPATIENT)
Dept: CARDIOLOGY | Facility: HOSPITAL | Age: 49
End: 2019-09-03

## 2019-09-03 ENCOUNTER — OUTPATIENT (OUTPATIENT)
Dept: OUTPATIENT SERVICES | Facility: HOSPITAL | Age: 49
LOS: 1 days | End: 2019-09-03
Payer: MEDICAID

## 2019-09-03 VITALS
WEIGHT: 224 LBS | SYSTOLIC BLOOD PRESSURE: 119 MMHG | OXYGEN SATURATION: 96 % | DIASTOLIC BLOOD PRESSURE: 73 MMHG | HEIGHT: 71 IN | BODY MASS INDEX: 31.36 KG/M2 | HEART RATE: 72 BPM

## 2019-09-03 DIAGNOSIS — I25.10 ATHEROSCLEROTIC HEART DISEASE OF NATIVE CORONARY ARTERY WITHOUT ANGINA PECTORIS: ICD-10-CM

## 2019-09-03 DIAGNOSIS — Z98.89 OTHER SPECIFIED POSTPROCEDURAL STATES: Chronic | ICD-10-CM

## 2019-09-03 PROCEDURE — G0463: CPT

## 2019-09-04 NOTE — PHYSICAL EXAM
[General Appearance - Well Developed] : well developed [Normal Appearance] : normal appearance [Well Groomed] : well groomed [General Appearance - Well Nourished] : well nourished [No Deformities] : no deformities [General Appearance - In No Acute Distress] : no acute distress [Normal Conjunctiva] : the conjunctiva exhibited no abnormalities [Eyelids - No Xanthelasma] : the eyelids demonstrated no xanthelasmas [Normal Oral Mucosa] : normal oral mucosa [No Oral Pallor] : no oral pallor [No Oral Cyanosis] : no oral cyanosis [Normal Jugular Venous A Waves Present] : normal jugular venous A waves present [Normal Jugular Venous V Waves Present] : normal jugular venous V waves present [No Jugular Venous Wild A Waves] : no jugular venous wild A waves [Respiration, Rhythm And Depth] : normal respiratory rhythm and effort [Exaggerated Use Of Accessory Muscles For Inspiration] : no accessory muscle use [Auscultation Breath Sounds / Voice Sounds] : lungs were clear to auscultation bilaterally [Heart Rate And Rhythm] : heart rate and rhythm were normal [Heart Sounds] : normal S1 and S2 [Systolic grade ___/6] : A grade [unfilled]/6 systolic murmur was heard. [Abdomen Soft] : soft [Abdomen Tenderness] : non-tender [Abdomen Mass (___ Cm)] : no abdominal mass palpated [Abnormal Walk] : normal gait [Gait - Sufficient For Exercise Testing] : the gait was sufficient for exercise testing [Nail Clubbing] : no clubbing of the fingernails [Cyanosis, Localized] : no localized cyanosis [Petechial Hemorrhages (___cm)] : no petechial hemorrhages [Skin Color & Pigmentation] : normal skin color and pigmentation [No Venous Stasis] : no venous stasis [] : no rash [Skin Lesions] : no skin lesions [No Skin Ulcers] : no skin ulcer [No Xanthoma] : no  xanthoma was observed [Oriented To Time, Place, And Person] : oriented to person, place, and time

## 2019-09-04 NOTE — HISTORY OF PRESENT ILLNESS
[FreeTextEntry1] : 47 M with CAD s/p PCI to LCx for anginal symptoms on 2014 which recurred in 2016 and was re-stented (stent within a stent), actively smoking presenting for follow up visit. Denies any complaints - no chest pain, orthopea, PND, palpitations.\par \par Had stopped smoking but recently restarted and is back to 1 pack per day. \par \par Otherwise, no complaints. \par \par Recent visit re: spider bite appreciated.

## 2019-09-04 NOTE — DISCUSSION/SUMMARY
[Coronary Artery Disease] : coronary artery disease [Stable] : stable [None] : none [Patient] : the patient [___ Month(s)] : [unfilled] month(s) [With Me] : with me [FreeTextEntry1] : 47 M with HLD, HTN and CAD s/p stent to LCx (2014) c/b instent restenosis (2016) and describes the quality of symptoms as similar at that time.\par \par #CAD - stable; No anginal symptoms. Nuke Stress in 2017 w/ mostly fixed defects suggestive of infarct with minimal marisela-infarct ischemia. LVEF = 54 %;LVEDV = 118 ml.\par - on aspirin, beta blocker, statin\par \par #Mild aortic stenosis -- confirmed on echo\par - monitor clinically \par \par #Smoking cessation -- continue with attempts for cessation. Patient will enroll in program. \par \par RTC in 6-12 months or sooner if needed\par \par YANA Castillo MD\par Cardiology Fellow\par \par \par

## 2019-10-09 ENCOUNTER — RX RENEWAL (OUTPATIENT)
Age: 49
End: 2019-10-09

## 2019-11-26 ENCOUNTER — CHART COPY (OUTPATIENT)
Age: 49
End: 2019-11-26

## 2019-11-27 ENCOUNTER — CHART COPY (OUTPATIENT)
Age: 49
End: 2019-11-27

## 2019-12-03 ENCOUNTER — OUTPATIENT (OUTPATIENT)
Dept: OUTPATIENT SERVICES | Facility: HOSPITAL | Age: 49
LOS: 1 days | End: 2019-12-03
Payer: MEDICAID

## 2019-12-03 ENCOUNTER — APPOINTMENT (OUTPATIENT)
Dept: CARDIOLOGY | Facility: HOSPITAL | Age: 49
End: 2019-12-03

## 2019-12-03 DIAGNOSIS — Z98.89 OTHER SPECIFIED POSTPROCEDURAL STATES: Chronic | ICD-10-CM

## 2019-12-03 DIAGNOSIS — I25.10 ATHEROSCLEROTIC HEART DISEASE OF NATIVE CORONARY ARTERY WITHOUT ANGINA PECTORIS: ICD-10-CM

## 2019-12-03 PROCEDURE — G0463: CPT

## 2019-12-03 PROCEDURE — 93005 ELECTROCARDIOGRAM TRACING: CPT

## 2019-12-03 NOTE — DISCUSSION/SUMMARY
[Coronary Artery Disease] : coronary artery disease [Stable] : stable [None] : none [Patient] : the patient [___ Month(s)] : [unfilled] month(s) [With Me] : with me [FreeTextEntry1] : 47 M with HLD, HTN and CAD s/p stent to LCx (2014) c/b instent restenosis (2016) and describes the quality of symptoms as similar at that time.\par \par #CAD - chest pain does not appear to be cardiac based on duration of pain and non-reproducibility w/ exercise. Nuke Stress in 2017 w/ mostly fixed defects suggestive of infarct with minimal marisela-infarct ischemia. LVEF = 54 %;LVEDV = 118 ml. EKG WNL today.\par - on aspirin, beta blocker, statin\par - for now monitor clinically. Patient will continue exercising and will call me by next Friday to report any patterns to his symptoms. \par \par #Mild aortic stenosis -- confirmed on echo\par - monitor clinically for now. Repeat echo in 3-5 years\par \par #Smoking cessation -- continue with attempts for cessation. Patient enrolled in smoking cessation program\par \par RTC in 6-12 months or sooner if needed\par \par YANA Castillo MD\par Cardiology Fellow\par \par \par

## 2019-12-03 NOTE — PHYSICAL EXAM
[General Appearance - Well Developed] : well developed [Well Groomed] : well groomed [Normal Appearance] : normal appearance [General Appearance - Well Nourished] : well nourished [General Appearance - In No Acute Distress] : no acute distress [No Deformities] : no deformities [Normal Conjunctiva] : the conjunctiva exhibited no abnormalities [Eyelids - No Xanthelasma] : the eyelids demonstrated no xanthelasmas [No Oral Pallor] : no oral pallor [Normal Oral Mucosa] : normal oral mucosa [Normal Jugular Venous A Waves Present] : normal jugular venous A waves present [No Oral Cyanosis] : no oral cyanosis [No Jugular Venous Wild A Waves] : no jugular venous wild A waves [Normal Jugular Venous V Waves Present] : normal jugular venous V waves present [Respiration, Rhythm And Depth] : normal respiratory rhythm and effort [Exaggerated Use Of Accessory Muscles For Inspiration] : no accessory muscle use [Auscultation Breath Sounds / Voice Sounds] : lungs were clear to auscultation bilaterally [Heart Rate And Rhythm] : heart rate and rhythm were normal [Heart Sounds] : normal S1 and S2 [Systolic grade ___/6] : A grade [unfilled]/6 systolic murmur was heard. [Abdomen Soft] : soft [Abdomen Tenderness] : non-tender [Abdomen Mass (___ Cm)] : no abdominal mass palpated [Abnormal Walk] : normal gait [Gait - Sufficient For Exercise Testing] : the gait was sufficient for exercise testing [Nail Clubbing] : no clubbing of the fingernails [Cyanosis, Localized] : no localized cyanosis [Petechial Hemorrhages (___cm)] : no petechial hemorrhages [Skin Color & Pigmentation] : normal skin color and pigmentation [] : no rash [No Venous Stasis] : no venous stasis [No Skin Ulcers] : no skin ulcer [Skin Lesions] : no skin lesions [No Xanthoma] : no  xanthoma was observed [Oriented To Time, Place, And Person] : oriented to person, place, and time

## 2019-12-03 NOTE — HISTORY OF PRESENT ILLNESS
[FreeTextEntry1] : 47 M with CAD s/p PCI to LCx for anginal symptoms on 2014 which recurred in 2016 and was re-stented (stent within a stent), actively smoking presenting for follow up visit. \par \par Has recently called about a pinching pain in his left chest area that was intermittent (lasted several seconds) for the past 1-2 weeks. No exertional dyspnea. Patient has been going to the gym and engaging the treadmill - going for about 10 minutes (HR about 110-120) 3x per week. Chest pain was not reproduced by this. Today no chest pain.\par \par Had stopped smoking but recently restarted and is back to 1 pack per day. \par \par

## 2019-12-03 NOTE — REVIEW OF SYSTEMS
[see HPI] : see HPI [Chest Pain] : chest pain [Negative] : Heme/Lymph [Feeling Fatigued] : not feeling fatigued

## 2020-06-02 ENCOUNTER — APPOINTMENT (OUTPATIENT)
Dept: CARDIOLOGY | Facility: HOSPITAL | Age: 50
End: 2020-06-02

## 2021-01-05 ENCOUNTER — NON-APPOINTMENT (OUTPATIENT)
Age: 51
End: 2021-01-05

## 2021-01-05 ENCOUNTER — LABORATORY RESULT (OUTPATIENT)
Age: 51
End: 2021-01-05

## 2021-01-05 ENCOUNTER — APPOINTMENT (OUTPATIENT)
Dept: CARDIOLOGY | Facility: HOSPITAL | Age: 51
End: 2021-01-05

## 2021-01-05 ENCOUNTER — OUTPATIENT (OUTPATIENT)
Dept: OUTPATIENT SERVICES | Facility: HOSPITAL | Age: 51
LOS: 1 days | End: 2021-01-05
Payer: MEDICAID

## 2021-01-05 VITALS
HEART RATE: 79 BPM | DIASTOLIC BLOOD PRESSURE: 74 MMHG | BODY MASS INDEX: 31.22 KG/M2 | WEIGHT: 223 LBS | HEIGHT: 71 IN | SYSTOLIC BLOOD PRESSURE: 132 MMHG | OXYGEN SATURATION: 96 %

## 2021-01-05 DIAGNOSIS — I25.10 ATHEROSCLEROTIC HEART DISEASE OF NATIVE CORONARY ARTERY WITHOUT ANGINA PECTORIS: ICD-10-CM

## 2021-01-05 DIAGNOSIS — Z98.89 OTHER SPECIFIED POSTPROCEDURAL STATES: Chronic | ICD-10-CM

## 2021-01-05 PROCEDURE — 93005 ELECTROCARDIOGRAM TRACING: CPT

## 2021-01-05 PROCEDURE — G0463: CPT

## 2021-01-05 NOTE — PHYSICAL EXAM
[General Appearance - Well Developed] : well developed [Normal Appearance] : normal appearance [Well Groomed] : well groomed [General Appearance - Well Nourished] : well nourished [No Deformities] : no deformities [General Appearance - In No Acute Distress] : no acute distress [Normal Conjunctiva] : the conjunctiva exhibited no abnormalities [Eyelids - No Xanthelasma] : the eyelids demonstrated no xanthelasmas [Normal Oral Mucosa] : normal oral mucosa [No Oral Pallor] : no oral pallor [No Oral Cyanosis] : no oral cyanosis [Normal Jugular Venous A Waves Present] : normal jugular venous A waves present [Normal Jugular Venous V Waves Present] : normal jugular venous V waves present [No Jugular Venous Wild A Waves] : no jugular venous wild A waves [Respiration, Rhythm And Depth] : normal respiratory rhythm and effort [Exaggerated Use Of Accessory Muscles For Inspiration] : no accessory muscle use [Auscultation Breath Sounds / Voice Sounds] : lungs were clear to auscultation bilaterally [Heart Rate And Rhythm] : heart rate and rhythm were normal [Heart Sounds] : normal S1 and S2 [Murmurs] : no murmurs present [Abdomen Soft] : soft [Abdomen Tenderness] : non-tender [Abdomen Mass (___ Cm)] : no abdominal mass palpated [Abnormal Walk] : normal gait [Gait - Sufficient For Exercise Testing] : the gait was sufficient for exercise testing [Nail Clubbing] : no clubbing of the fingernails [Cyanosis, Localized] : no localized cyanosis [Petechial Hemorrhages (___cm)] : no petechial hemorrhages [Skin Color & Pigmentation] : normal skin color and pigmentation [] : no rash [No Venous Stasis] : no venous stasis [Skin Lesions] : no skin lesions [No Skin Ulcers] : no skin ulcer [No Xanthoma] : no  xanthoma was observed

## 2021-01-06 ENCOUNTER — TRANSCRIPTION ENCOUNTER (OUTPATIENT)
Age: 51
End: 2021-01-06

## 2021-01-11 NOTE — HISTORY OF PRESENT ILLNESS
[FreeTextEntry1] : 49 yo M with CAD s/p PCI to LCx for anginal symptoms in 2014 which recurred in 2016 and was re-stented (stent within a stent), actively smoking presenting for follow up visit. \par \par Patient was last seen a year ago with Dr Castillo. At the time, he complained of intermittent left chest discomfort that was deemed to not be an anginal equivalent. He has since felt well and has no complaints. He has not been very active because of the pandemic but is eager to begin exercising again. With respect to smoking, he restarted during the early period of the pandemic and has continued. He has been adherent to his medication regimen.\par \par \par ASA 81 mg daily\par atorvastatin 80mg qhs\par metoprolol succinate 50mg daily

## 2021-03-09 LAB
CHOLEST SERPL-MCNC: 97 MG/DL
HDLC SERPL-MCNC: 29 MG/DL
LDLC SERPL CALC-MCNC: 36 MG/DL
NONHDLC SERPL-MCNC: 68 MG/DL
TRIGL SERPL-MCNC: 159 MG/DL

## 2021-04-18 ENCOUNTER — LABORATORY RESULT (OUTPATIENT)
Age: 51
End: 2021-04-18

## 2021-08-12 ENCOUNTER — APPOINTMENT (OUTPATIENT)
Dept: INTERNAL MEDICINE | Facility: CLINIC | Age: 51
End: 2021-08-12
Payer: MEDICAID

## 2021-08-12 ENCOUNTER — OUTPATIENT (OUTPATIENT)
Dept: OUTPATIENT SERVICES | Facility: HOSPITAL | Age: 51
LOS: 1 days | End: 2021-08-12
Payer: MEDICAID

## 2021-08-12 VITALS
SYSTOLIC BLOOD PRESSURE: 110 MMHG | HEART RATE: 80 BPM | WEIGHT: 212 LBS | DIASTOLIC BLOOD PRESSURE: 70 MMHG | OXYGEN SATURATION: 97 % | HEIGHT: 71 IN | BODY MASS INDEX: 29.68 KG/M2

## 2021-08-12 DIAGNOSIS — L40.9 PSORIASIS, UNSPECIFIED: ICD-10-CM

## 2021-08-12 DIAGNOSIS — D49.2 NEOPLASM OF UNSPECIFIED BEHAVIOR OF BONE, SOFT TISSUE, AND SKIN: ICD-10-CM

## 2021-08-12 DIAGNOSIS — I10 ESSENTIAL (PRIMARY) HYPERTENSION: ICD-10-CM

## 2021-08-12 DIAGNOSIS — Z98.89 OTHER SPECIFIED POSTPROCEDURAL STATES: Chronic | ICD-10-CM

## 2021-08-12 PROCEDURE — G0463: CPT

## 2021-08-12 PROCEDURE — 99213 OFFICE O/P EST LOW 20 MIN: CPT | Mod: GE

## 2021-08-12 NOTE — REVIEW OF SYSTEMS
[Fever] : no fever [Chills] : no chills [Night Sweats] : no night sweats [Recent Change In Weight] : ~T no recent weight change [Discharge] : no discharge [Pain] : no pain [Vision Problems] : no vision problems [Itching] : no itching [Earache] : no earache [Hearing Loss] : no hearing loss [Nasal Discharge] : no nasal discharge [Sore Throat] : no sore throat [Chest Pain] : no chest pain [Palpitations] : no palpitations [Orthopena] : no orthopnea [Paroxysmal Nocturnal Dyspnea] : no paroxysmal nocturnal dyspnea [Shortness Of Breath] : no shortness of breath [Wheezing] : no wheezing [Cough] : no cough [Abdominal Pain] : no abdominal pain [Nausea] : no nausea [Vomiting] : no vomiting [Heartburn] : no heartburn [Dysuria] : no dysuria [Incontinence] : no incontinence [Hematuria] : no hematuria [Frequency] : no frequency [Joint Pain] : no joint pain [Joint Stiffness] : no joint stiffness [Back Pain] : no back pain [Joint Swelling] : no joint swelling [Itching] : no itching [Mole Changes] : no mole changes [Skin Rash] : no skin rash [Headache] : no headache [Dizziness] : no dizziness [Unsteady Walk] : no ataxia [Memory Loss] : no memory loss [Suicidal] : not suicidal [Insomnia] : no insomnia [Anxiety] : no anxiety [Easy Bleeding] : no easy bleeding [Easy Bruising] : no easy bruising [Swollen Glands] : no swollen glands

## 2021-08-12 NOTE — ASSESSMENT
[FreeTextEntry1] : 50 yo M with CAD s/p PCI to LCx for anginal symptoms in 2014 which recurred in 2016 and was re-stented (stent within a stent), actively smoking presenting for follow up visit.\par \par #R/o basal cell carcinoma\par - Lesion on face appears consistent w/ basal cell carcinoma, but differential also includes melanotic nevi, melanoma\par - dermatology referral provided\par - avoid manipulation of lesion\par \par #Psoriasis\par - Lesion on L upper arm showing 3cm well circumscribed scaly lesion, concerning for psoriasis\par - Dermatology referral provided\par \par #HCM\par - RTC as needed or for next annual

## 2021-08-12 NOTE — HISTORY OF PRESENT ILLNESS
[FreeTextEntry1] : Pt presenting for a f/u appt. [de-identified] : 52 yo M with CAD s/p PCI to LCx for anginal symptoms in 2014 which recurred in 2016 and was re-stented (stent within a stent), actively smoking presenting for new lesion on R side of cheek. Pt reports that he had a small hyperpigmented nevi for the past 3-4 years that has been stable but recently began increasing in size for thepast 3 months. Pt reports that now the size is approaching close to 1.5cm. Pt reports that he recently shaved and accidently cut into the lesion, but there is no active bleeding at the site. Pt also reports a lesion on the back of his L upper arm, described as scaly in nature. Pt reports that 2 years ago, he had a traumatic injury to that arm and that lesion has never healed since then. Otherwise, pt denies fevers, chills, chest p/p, sob, n/v/d, sick contacts, recent travel.

## 2021-08-12 NOTE — PHYSICAL EXAM
[Normal] : affect was normal and insight and judgment were intact [de-identified] : 1.5-2cm hyperpigmented raised lesion w/ irregular borders on R cheek. 3cm scaly raised lesion, well circumscribed, appears like psoriasis on L upper arm

## 2021-08-16 ENCOUNTER — APPOINTMENT (OUTPATIENT)
Dept: INTERNAL MEDICINE | Facility: CLINIC | Age: 51
End: 2021-08-16

## 2021-08-16 DIAGNOSIS — D49.2 NEOPLASM OF UNSPECIFIED BEHAVIOR OF BONE, SOFT TISSUE, AND SKIN: ICD-10-CM

## 2021-08-16 DIAGNOSIS — L40.9 PSORIASIS, UNSPECIFIED: ICD-10-CM

## 2021-09-22 ENCOUNTER — APPOINTMENT (OUTPATIENT)
Dept: DERMATOLOGY | Facility: CLINIC | Age: 51
End: 2021-09-22
Payer: MEDICAID

## 2021-09-22 ENCOUNTER — LABORATORY RESULT (OUTPATIENT)
Age: 51
End: 2021-09-22

## 2021-09-22 DIAGNOSIS — D48.9 NEOPLASM OF UNCERTAIN BEHAVIOR, UNSPECIFIED: ICD-10-CM

## 2021-09-22 DIAGNOSIS — L81.4 OTHER MELANIN HYPERPIGMENTATION: ICD-10-CM

## 2021-09-22 DIAGNOSIS — D22.9 MELANOCYTIC NEVI, UNSPECIFIED: ICD-10-CM

## 2021-09-22 PROCEDURE — 99213 OFFICE O/P EST LOW 20 MIN: CPT | Mod: 25

## 2021-09-22 PROCEDURE — 11103 TANGNTL BX SKIN EA SEP/ADDL: CPT

## 2021-09-22 PROCEDURE — 11102 TANGNTL BX SKIN SINGLE LES: CPT

## 2021-09-22 NOTE — PHYSICAL EXAM
[FreeTextEntry3] : AAOx3, pleasant, NAD, no visual lymphadenopathy\par hair, scalp, face, nose, eyelids, ears, lips, oropharynx, neck, chest, abdomen, back, right arm, left arm, nails, and hands examined with all normal findings,\par pertinent findings include:\par \par multiple benign nevi and lentigines\par + inflamed, waxy, keratotic papule on right cheek, verrucous papule on left forehead medial and lateral, left upper inner arm with large verrucous plaque

## 2021-09-22 NOTE — ASSESSMENT
[FreeTextEntry1] : 1) benign findings as above- education\par \par 2) Shave bx location right cheek\par diagnosis: r/o ISK\par  \par Shave biopsy performed today over above location, risks and benefits discussed including incomplete removal, not enough tissue for diagnosis scarring and infection, informed consent obtained, pictures taken,  cleaned with alcohol and anesthetized with 1%lido+epi, 0.3 cc total, hemostasis obtained with monsels, vaseline and bandaid placed, tolerated well, wound care reviewed, specimen sent to pathology.\par \par 3) Shave bx location left forehead lateral\par diagnosis: r/o BCC\par  \par Shave biopsy performed today over above location, risks and benefits discussed including incomplete removal, not enough tissue for diagnosis scarring and infection, informed consent obtained, pictures taken,  cleaned with alcohol and anesthetized with 1%lido+epi, 0.3 cc total, hemostasis obtained with monsels, vaseline and bandaid placed, tolerated well, wound care reviewed, specimen sent to pathology.\par \par 4) Shave bx location left forehead medial\par diagnosis: r/o BCC\par  \par Shave biopsy performed today over above location, risks and benefits discussed including incomplete removal, not enough tissue for diagnosis scarring and infection, informed consent obtained, pictures taken,  cleaned with alcohol and anesthetized with 1%lido+epi, 0.3 cc total, hemostasis obtained with monsels, vaseline and bandaid placed, tolerated well, wound care reviewed, specimen sent to pathology.\par \par 5) Shave bx location left upper inner arm\par diagnosis: r/o foreign body vs. LSC vs. SCC in traumatic scar\par  \par Shave biopsy performed today over above location, risks and benefits discussed including incomplete removal, not enough tissue for diagnosis scarring and infection, informed consent obtained, pictures taken,  cleaned with alcohol and anesthetized with 1%lido+epi, 0.3 cc total, hemostasis obtained with monsels, vaseline and bandaid placed, tolerated well, wound care reviewed, specimen sent to pathology.\par \par

## 2021-09-22 NOTE — HISTORY OF PRESENT ILLNESS
[FreeTextEntry1] : growths [de-identified] : 51 year old male here with growths. no tx tried. lesion on right cheek was growing and cut with shaving. left upper inner arm is s/p injury. has scratched lesion on forehead.

## 2021-10-04 ENCOUNTER — NON-APPOINTMENT (OUTPATIENT)
Age: 51
End: 2021-10-04

## 2021-10-14 ENCOUNTER — OUTPATIENT (OUTPATIENT)
Dept: OUTPATIENT SERVICES | Facility: HOSPITAL | Age: 51
LOS: 1 days | End: 2021-10-14
Payer: MEDICAID

## 2021-10-14 ENCOUNTER — APPOINTMENT (OUTPATIENT)
Dept: CARDIOLOGY | Facility: HOSPITAL | Age: 51
End: 2021-10-14

## 2021-10-14 VITALS
OXYGEN SATURATION: 96 % | DIASTOLIC BLOOD PRESSURE: 74 MMHG | HEART RATE: 74 BPM | HEIGHT: 71 IN | SYSTOLIC BLOOD PRESSURE: 113 MMHG

## 2021-10-14 DIAGNOSIS — Z98.89 OTHER SPECIFIED POSTPROCEDURAL STATES: Chronic | ICD-10-CM

## 2021-10-14 DIAGNOSIS — I25.10 ATHEROSCLEROTIC HEART DISEASE OF NATIVE CORONARY ARTERY WITHOUT ANGINA PECTORIS: ICD-10-CM

## 2021-10-14 PROCEDURE — 93005 ELECTROCARDIOGRAM TRACING: CPT

## 2021-10-14 PROCEDURE — G0463: CPT

## 2021-10-15 ENCOUNTER — NON-APPOINTMENT (OUTPATIENT)
Age: 51
End: 2021-10-15

## 2021-10-15 DIAGNOSIS — R03.1 NONSPECIFIC LOW BLOOD-PRESSURE READING: ICD-10-CM

## 2021-10-18 ENCOUNTER — NON-APPOINTMENT (OUTPATIENT)
Age: 51
End: 2021-10-18

## 2021-10-21 LAB
ESTIMATED AVERAGE GLUCOSE: 128 MG/DL
HBA1C MFR BLD HPLC: 6.1 %

## 2021-10-27 ENCOUNTER — OUTPATIENT (OUTPATIENT)
Dept: OUTPATIENT SERVICES | Facility: HOSPITAL | Age: 51
LOS: 1 days | End: 2021-10-27
Payer: MEDICAID

## 2021-10-27 ENCOUNTER — APPOINTMENT (OUTPATIENT)
Dept: INTERNAL MEDICINE | Facility: CLINIC | Age: 51
End: 2021-10-27
Payer: MEDICAID

## 2021-10-27 VITALS
OXYGEN SATURATION: 98 % | WEIGHT: 212 LBS | DIASTOLIC BLOOD PRESSURE: 78 MMHG | HEART RATE: 67 BPM | BODY MASS INDEX: 29.68 KG/M2 | SYSTOLIC BLOOD PRESSURE: 110 MMHG | HEIGHT: 71 IN

## 2021-10-27 DIAGNOSIS — F22 DELUSIONAL DISORDERS: ICD-10-CM

## 2021-10-27 DIAGNOSIS — Z23 ENCOUNTER FOR IMMUNIZATION: ICD-10-CM

## 2021-10-27 DIAGNOSIS — Z87.2 PERSONAL HISTORY OF DISEASES OF THE SKIN AND SUBCUTANEOUS TISSUE: ICD-10-CM

## 2021-10-27 DIAGNOSIS — R03.1 NONSPECIFIC LOW BLOOD-PRESSURE READING: ICD-10-CM

## 2021-10-27 DIAGNOSIS — Z98.89 OTHER SPECIFIED POSTPROCEDURAL STATES: Chronic | ICD-10-CM

## 2021-10-27 DIAGNOSIS — R73.03 PREDIABETES.: ICD-10-CM

## 2021-10-27 DIAGNOSIS — I10 ESSENTIAL (PRIMARY) HYPERTENSION: ICD-10-CM

## 2021-10-27 PROCEDURE — G0463: CPT | Mod: 25

## 2021-10-27 PROCEDURE — G0008: CPT

## 2021-10-27 PROCEDURE — 99213 OFFICE O/P EST LOW 20 MIN: CPT | Mod: GE

## 2021-10-27 PROCEDURE — 90688 IIV4 VACCINE SPLT 0.5 ML IM: CPT

## 2021-10-27 RX ORDER — BETAMETHASONE DIPROPIONATE 0.5 MG/G
0.05 OINTMENT, AUGMENTED TOPICAL
Qty: 1 | Refills: 0 | Status: DISCONTINUED | COMMUNITY
Start: 2019-07-23 | End: 2021-10-27

## 2021-10-27 NOTE — ASSESSMENT
[FreeTextEntry1] : Patient on Zyprexa and trazodone, the combination of which may lead to prolonged QTc. EKG from 10/14/21 shows normal QTc of 394 ms.

## 2021-10-27 NOTE — PHYSICAL EXAM
[No Acute Distress] : no acute distress [Well Nourished] : well nourished [Well Developed] : well developed [Well-Appearing] : well-appearing [Normal Voice/Communication] : normal voice/communication [Normal Sclera/Conjunctiva] : normal sclera/conjunctiva [PERRL] : pupils equal round and reactive to light [EOMI] : extraocular movements intact [Normal Outer Ear/Nose] : the outer ears and nose were normal in appearance [Supple] : supple [No Respiratory Distress] : no respiratory distress  [No Accessory Muscle Use] : no accessory muscle use [Clear to Auscultation] : lungs were clear to auscultation bilaterally [Normal Rate] : normal rate  [Regular Rhythm] : with a regular rhythm [Normal S1, S2] : normal S1 and S2 [No Murmur] : no murmur heard [Pedal Pulses Present] : the pedal pulses are present [No Edema] : there was no peripheral edema [No Palpable Aorta] : no palpable aorta [No Extremity Clubbing/Cyanosis] : no extremity clubbing/cyanosis [Soft] : abdomen soft [Non Tender] : non-tender [Non-distended] : non-distended [No Masses] : no abdominal mass palpated [No HSM] : no HSM [Normal Bowel Sounds] : normal bowel sounds [Grossly Normal Strength/Tone] : grossly normal strength/tone [No Rash] : no rash [Coordination Grossly Intact] : coordination grossly intact [No Focal Deficits] : no focal deficits [Normal Gait] : normal gait [Speech Grossly Normal] : speech grossly normal [Memory Grossly Normal] : memory grossly normal [Normal Affect] : the affect was normal [Alert and Oriented x3] : oriented to person, place, and time [Normal Mood] : the mood was normal [Normal Insight/Judgement] : insight and judgment were intact [de-identified] : overweight/obese body habitus

## 2021-10-27 NOTE — PLAN
[FreeTextEntry1] : \par PLAN AS ABOVE IN ASSESSMENT.\par \par HCM: \par 1. Flu vaccine administered today. Patient deferred Shingles vaccine for now, reassess on next visit.\par 2. Obtain CBC, CMP, lipid profile and TSH today, for routine eval.\par \par RTC this year for a CPE. \par \par D/w Dr. Vazquez.\par

## 2021-10-27 NOTE — HEALTH RISK ASSESSMENT
[] : Yes [0] : 1) Little interest or pleasure doing things: Not at all (0) [1] : 2) Feeling down, depressed, or hopeless for several days (1) [PHQ-2 Negative - No further assessment needed] : PHQ-2 Negative - No further assessment needed [de-identified] : Cardiology [[id number]] : 1

## 2021-10-27 NOTE — HISTORY OF PRESENT ILLNESS
[FreeTextEntry1] : elevated HbA1c [de-identified] : 52 yo M with CAD s/p PCI to LCx (2014) c/b in-stent stenosis needed stent-in-stent PCI, active smoker and hx prediabetes (HbA1c 6.1% 10/2021), who presents to follow-up on an elevated HbA1c.\par \par Went to cardiologist recently, who checked HbA1c and noted it was in pre-diabetic range and asked patient to follow-up with PCP. Patient otherwise feels fine and without any complaints. He does not follow any particular diet or exercise regimen. Fam hx of DMT2.\par \par ROS negative for f/c, CP, SOB, abd pain, n/v/d.

## 2021-10-27 NOTE — COUNSELING
[Risk of tobacco use and health benefits of smoking cessation discussed] : Risk of tobacco use and health benefits of smoking cessation discussed [Benefits of weight loss discussed] : Benefits of weight loss discussed [Encouraged to increase physical activity] : Encouraged to increase physical activity [Patient motivation] : Patient motivation [Willing to Quit Smoking] : Not willing to quit smoking

## 2021-10-28 LAB
ALBUMIN SERPL ELPH-MCNC: 4.5 G/DL
ALP BLD-CCNC: 61 U/L
ALT SERPL-CCNC: 28 U/L
ANION GAP SERPL CALC-SCNC: 13 MMOL/L
AST SERPL-CCNC: 19 U/L
BASOPHILS # BLD AUTO: 0.03 K/UL
BASOPHILS NFR BLD AUTO: 0.3 %
BILIRUB SERPL-MCNC: 0.5 MG/DL
BUN SERPL-MCNC: 16 MG/DL
CALCIUM SERPL-MCNC: 9 MG/DL
CHLORIDE SERPL-SCNC: 103 MMOL/L
CHOLEST SERPL-MCNC: 87 MG/DL
CO2 SERPL-SCNC: 24 MMOL/L
CREAT SERPL-MCNC: 0.96 MG/DL
EOSINOPHIL # BLD AUTO: 0.16 K/UL
EOSINOPHIL NFR BLD AUTO: 1.6 %
GLUCOSE SERPL-MCNC: 78 MG/DL
HCT VFR BLD CALC: 46.5 %
HDLC SERPL-MCNC: 28 MG/DL
HGB BLD-MCNC: 15.8 G/DL
IMM GRANULOCYTES NFR BLD AUTO: 0.3 %
LDLC SERPL CALC-MCNC: 39 MG/DL
LYMPHOCYTES # BLD AUTO: 2.69 K/UL
LYMPHOCYTES NFR BLD AUTO: 27.6 %
MAN DIFF?: NORMAL
MCHC RBC-ENTMCNC: 30.9 PG
MCHC RBC-ENTMCNC: 34 GM/DL
MCV RBC AUTO: 91 FL
MONOCYTES # BLD AUTO: 0.97 K/UL
MONOCYTES NFR BLD AUTO: 9.9 %
NEUTROPHILS # BLD AUTO: 5.88 K/UL
NEUTROPHILS NFR BLD AUTO: 60.3 %
NONHDLC SERPL-MCNC: 59 MG/DL
PLATELET # BLD AUTO: 270 K/UL
POTASSIUM SERPL-SCNC: 4.9 MMOL/L
PROT SERPL-MCNC: 7.7 G/DL
RBC # BLD: 5.11 M/UL
RBC # FLD: 14.5 %
SODIUM SERPL-SCNC: 140 MMOL/L
TRIGL SERPL-MCNC: 100 MG/DL
TSH SERPL-ACNC: 1.85 UIU/ML
WBC # FLD AUTO: 9.76 K/UL

## 2021-11-01 DIAGNOSIS — E78.5 HYPERLIPIDEMIA, UNSPECIFIED: ICD-10-CM

## 2021-11-01 DIAGNOSIS — F22 DELUSIONAL DISORDERS: ICD-10-CM

## 2021-11-01 DIAGNOSIS — R73.03 PREDIABETES: ICD-10-CM

## 2021-11-01 DIAGNOSIS — Z23 ENCOUNTER FOR IMMUNIZATION: ICD-10-CM

## 2021-11-24 ENCOUNTER — APPOINTMENT (OUTPATIENT)
Dept: INTERNAL MEDICINE | Facility: CLINIC | Age: 51
End: 2021-11-24

## 2022-02-16 NOTE — CARDIOLOGY SUMMARY
[de-identified] : 10/15/2021: JESSY  [de-identified] : 4/15: EF WNL, LAE   [de-identified] : 4/15: EF WNL, LAE \par  [de-identified] : 8/14 Stent to LCX, 6/16: 95% in-stent restenosis of LCx s/p IVELISSE 3.0 Resolute.

## 2022-02-16 NOTE — REASON FOR VISIT
[Coronary Artery Disease] : coronary artery disease [FreeTextEntry1] : Mr. LEWIS is here for follow up with CAD.

## 2022-02-16 NOTE — HISTORY OF PRESENT ILLNESS
[FreeTextEntry1] : 50M with history of CAD s/p PCI to LCx for anginal symptoms and 2014 and then re-stenting in 2016 for recurrence, current smoker,  HLD, Prediabetes, and psychiatric co-morbids. \par \par Patient last followed up in 01/2021. Patient reports that since then he has not had any anginal symptoms. Reports that he has good exercise tolerance and able to walk 2 miles without any issues. No trouble with stairs. Denies palpitations, LE edema, orthopnea, or PND. \par \par He is currently smoking and has smoked for 30+ years. Not interested in quiting today. \par \par Current medications are ASA 81 mg, Atorvastatin 80 mg qhs, and metoprolol succinate 50 mg qd

## 2022-02-22 ENCOUNTER — RX RENEWAL (OUTPATIENT)
Age: 52
End: 2022-02-22

## 2022-08-04 ENCOUNTER — APPOINTMENT (OUTPATIENT)
Dept: CARDIOLOGY | Facility: HOSPITAL | Age: 52
End: 2022-08-04

## 2022-08-10 ENCOUNTER — APPOINTMENT (OUTPATIENT)
Dept: CARDIOLOGY | Facility: HOSPITAL | Age: 52
End: 2022-08-10

## 2022-08-10 ENCOUNTER — NON-APPOINTMENT (OUTPATIENT)
Age: 52
End: 2022-08-10

## 2022-08-10 ENCOUNTER — OUTPATIENT (OUTPATIENT)
Dept: OUTPATIENT SERVICES | Facility: HOSPITAL | Age: 52
LOS: 1 days | End: 2022-08-10
Payer: MEDICAID

## 2022-08-10 VITALS
SYSTOLIC BLOOD PRESSURE: 117 MMHG | BODY MASS INDEX: 30.8 KG/M2 | HEART RATE: 77 BPM | WEIGHT: 220 LBS | OXYGEN SATURATION: 97 % | HEIGHT: 71 IN | DIASTOLIC BLOOD PRESSURE: 74 MMHG

## 2022-08-10 DIAGNOSIS — Z98.89 OTHER SPECIFIED POSTPROCEDURAL STATES: Chronic | ICD-10-CM

## 2022-08-10 DIAGNOSIS — I25.10 ATHEROSCLEROTIC HEART DISEASE OF NATIVE CORONARY ARTERY WITHOUT ANGINA PECTORIS: ICD-10-CM

## 2022-08-10 PROCEDURE — 93005 ELECTROCARDIOGRAM TRACING: CPT

## 2022-08-10 PROCEDURE — G0463: CPT

## 2022-08-10 NOTE — REASON FOR VISIT
details…
[FreeTextEntry1] : 52 M with PMH of CAD s/p IVELISSE to LCx in 2014 and again in 2016 for instent stenosis. He is an active smoker 30+ ppy history, prior alcohol use disorder now in AA. He is doing well with no complaints, here for check up and refills. No chest pain, SOB, leg edema, PND or orthopnea.

## 2022-08-10 NOTE — DISCUSSION/SUMMARY
[FreeTextEntry1] : 52 M with PMH of CAD (IVELISSE in Lcx in 2014 and 2016 due to in stent restenosis), HLD, pre DM (a1c 6.1),, active smoker presents for follow up. \par \par \par CAD: normal EF\par -continue with ASA 81mg, metoprolol 50mg daily and atorvastatin 80mg. \par -discussed smoking cessation, he says he has a pack with AA member to stop in 4 months when he turns 53. He tried wellbutrin and chantix but he is on 3 anti depressants/psychotics. He will follow up with psychiatrist to discuss possible oral treatment for his smoking. \par \par RTC in 1 year or as needed.

## 2022-11-22 ENCOUNTER — APPOINTMENT (OUTPATIENT)
Dept: INTERNAL MEDICINE | Facility: CLINIC | Age: 52
End: 2022-11-22

## 2022-11-22 ENCOUNTER — OUTPATIENT (OUTPATIENT)
Dept: OUTPATIENT SERVICES | Facility: HOSPITAL | Age: 52
LOS: 1 days | End: 2022-11-22
Payer: MEDICAID

## 2022-11-22 VITALS
HEIGHT: 71 IN | BODY MASS INDEX: 30.8 KG/M2 | OXYGEN SATURATION: 95 % | HEART RATE: 80 BPM | DIASTOLIC BLOOD PRESSURE: 70 MMHG | SYSTOLIC BLOOD PRESSURE: 116 MMHG | WEIGHT: 220 LBS

## 2022-11-22 DIAGNOSIS — F17.210 NICOTINE DEPENDENCE, CIGARETTES, UNCOMPLICATED: ICD-10-CM

## 2022-11-22 DIAGNOSIS — Z98.89 OTHER SPECIFIED POSTPROCEDURAL STATES: Chronic | ICD-10-CM

## 2022-11-22 DIAGNOSIS — I10 ESSENTIAL (PRIMARY) HYPERTENSION: ICD-10-CM

## 2022-11-22 DIAGNOSIS — Z00.00 ENCOUNTER FOR GENERAL ADULT MEDICAL EXAMINATION W/OUT ABNORMAL FINDINGS: ICD-10-CM

## 2022-11-22 PROCEDURE — G0463: CPT | Mod: 25

## 2022-11-22 PROCEDURE — 84443 ASSAY THYROID STIM HORMONE: CPT

## 2022-11-22 PROCEDURE — 80061 LIPID PANEL: CPT

## 2022-11-22 PROCEDURE — G0008: CPT

## 2022-11-22 PROCEDURE — 99213 OFFICE O/P EST LOW 20 MIN: CPT | Mod: GE,25

## 2022-11-22 PROCEDURE — 99396 PREV VISIT EST AGE 40-64: CPT | Mod: GE

## 2022-11-22 PROCEDURE — 83036 HEMOGLOBIN GLYCOSYLATED A1C: CPT

## 2022-11-22 PROCEDURE — 80053 COMPREHEN METABOLIC PANEL: CPT

## 2022-11-22 PROCEDURE — 85025 COMPLETE CBC W/AUTO DIFF WBC: CPT

## 2022-11-22 PROCEDURE — 90688 IIV4 VACCINE SPLT 0.5 ML IM: CPT

## 2022-11-23 LAB
ALBUMIN SERPL ELPH-MCNC: 4.2 G/DL
ALP BLD-CCNC: 54 U/L
ALT SERPL-CCNC: 25 U/L
ANION GAP SERPL CALC-SCNC: 11 MMOL/L
AST SERPL-CCNC: 20 U/L
BASOPHILS # BLD AUTO: 0.03 K/UL
BASOPHILS NFR BLD AUTO: 0.3 %
BILIRUB SERPL-MCNC: 0.2 MG/DL
BUN SERPL-MCNC: 15 MG/DL
CALCIUM SERPL-MCNC: 9.6 MG/DL
CHLORIDE SERPL-SCNC: 104 MMOL/L
CHOLEST SERPL-MCNC: 96 MG/DL
CO2 SERPL-SCNC: 26 MMOL/L
CREAT SERPL-MCNC: 1.06 MG/DL
EGFR: 83 ML/MIN/1.73M2
EOSINOPHIL # BLD AUTO: 0.21 K/UL
EOSINOPHIL NFR BLD AUTO: 1.8 %
ESTIMATED AVERAGE GLUCOSE: 128 MG/DL
GLUCOSE SERPL-MCNC: 106 MG/DL
HBA1C MFR BLD HPLC: 6.1 %
HCT VFR BLD CALC: 44 %
HDLC SERPL-MCNC: 29 MG/DL
HGB BLD-MCNC: 14.3 G/DL
IMM GRANULOCYTES NFR BLD AUTO: 0.4 %
LDLC SERPL CALC-MCNC: 32 MG/DL
LYMPHOCYTES # BLD AUTO: 3.34 K/UL
LYMPHOCYTES NFR BLD AUTO: 29.3 %
MAN DIFF?: NORMAL
MCHC RBC-ENTMCNC: 30.6 PG
MCHC RBC-ENTMCNC: 32.5 GM/DL
MCV RBC AUTO: 94.2 FL
MONOCYTES # BLD AUTO: 1.24 K/UL
MONOCYTES NFR BLD AUTO: 10.9 %
NEUTROPHILS # BLD AUTO: 6.54 K/UL
NEUTROPHILS NFR BLD AUTO: 57.3 %
NONHDLC SERPL-MCNC: 66 MG/DL
PLATELET # BLD AUTO: 241 K/UL
POTASSIUM SERPL-SCNC: 4.1 MMOL/L
PROT SERPL-MCNC: 7.4 G/DL
RBC # BLD: 4.67 M/UL
RBC # FLD: 14.4 %
SODIUM SERPL-SCNC: 141 MMOL/L
TRIGL SERPL-MCNC: 170 MG/DL
TSH SERPL-ACNC: 1.9 UIU/ML
WBC # FLD AUTO: 11.4 K/UL

## 2022-11-29 DIAGNOSIS — Z23 ENCOUNTER FOR IMMUNIZATION: ICD-10-CM

## 2022-11-29 DIAGNOSIS — F17.210 NICOTINE DEPENDENCE, CIGARETTES, UNCOMPLICATED: ICD-10-CM

## 2022-11-29 DIAGNOSIS — Z00.00 ENCOUNTER FOR GENERAL ADULT MEDICAL EXAMINATION WITHOUT ABNORMAL FINDINGS: ICD-10-CM

## 2023-01-04 NOTE — END OF VISIT
[] : Resident [FreeTextEntry3] : 51M with CAD s/p PCI to LCx (2014) c/b in-stent stenosis needed stent-in-stent PCI, active smoker and hx prediabetes (HbA1c 6.1% 10/2021), that comes in for comprehensive PE, no acute complaints at this visit. Patient interested in smoking cessation, will reach out to [facility]. Will obtain labs on this visit.

## 2023-01-04 NOTE — HISTORY OF PRESENT ILLNESS
[FreeTextEntry1] : Annual visit [de-identified] : Pt is a 52 yo M with CAD s/p PCI to LCx (2014) c/b in-stent stenosis needed stent-in-stent PCI, active smoker and hx prediabetes (HbA1c 6.1% 10/2021), who last presented for follow-up on an elevated HbA1c. He follows with a cardiologist regularly. Patient today has no acute complaints. He states that he feels healthy. He denies any CP, SOB, palpitations. Denies any stroke like symptoms or episodes in the past year; denies dizziness, nausea, vomiting, diarrhea, constipation, urinary problems, recent illnesses, numbness, tingling. Patient states that he lives in a house with his parents and feels comfortable. He quit drinking alcohol 8 years ago but has been smoking 1-2 packs of cigarettes a day since he was 16 years old. He has tried the [facility] smoking cessation program and was able to successfully quit for 6 months but relapsed. He is interested in trying it out again with a friend for extra motivation. He is also interested in doing bloodwork to check his sugar levels and a flu shot.

## 2023-01-04 NOTE — ASSESSMENT
[FreeTextEntry1] : Pt is a 50 yo M with CAD s/p PCI to LCx (2014) c/b in-stent stenosis needed stent-in-stent PCI, active smoker and hx prediabetes (HbA1c 6.1% 10/2021), that comes in for comprehensive PE\par \par #Smoking\par Smoking 1-2 packs a day since he was 16; however no current SOB, distress, hx of asthma/COPD/sputum production. Patient interested in quitting; offered resources but states that he will reach out to the [facility] smoking cessation program himself\par \par #HCM\par colonoscopy referral given;\par cbc, cmp, tsh, a1c, lipid panel, flu shot\par \par Case c/w Dr. Mendenhall.

## 2023-05-03 ENCOUNTER — NON-APPOINTMENT (OUTPATIENT)
Age: 53
End: 2023-05-03

## 2023-05-04 ENCOUNTER — OUTPATIENT (OUTPATIENT)
Dept: OUTPATIENT SERVICES | Facility: HOSPITAL | Age: 53
LOS: 1 days | End: 2023-05-04
Payer: MEDICAID

## 2023-05-04 ENCOUNTER — APPOINTMENT (OUTPATIENT)
Dept: INTERNAL MEDICINE | Facility: CLINIC | Age: 53
End: 2023-05-04
Payer: MEDICAID

## 2023-05-04 VITALS
HEART RATE: 80 BPM | DIASTOLIC BLOOD PRESSURE: 84 MMHG | OXYGEN SATURATION: 95 % | HEIGHT: 71 IN | BODY MASS INDEX: 30.94 KG/M2 | WEIGHT: 221 LBS | SYSTOLIC BLOOD PRESSURE: 136 MMHG

## 2023-05-04 DIAGNOSIS — Z98.89 OTHER SPECIFIED POSTPROCEDURAL STATES: Chronic | ICD-10-CM

## 2023-05-04 DIAGNOSIS — M54.9 DORSALGIA, UNSPECIFIED: ICD-10-CM

## 2023-05-04 DIAGNOSIS — I10 ESSENTIAL (PRIMARY) HYPERTENSION: ICD-10-CM

## 2023-05-04 PROCEDURE — 99213 OFFICE O/P EST LOW 20 MIN: CPT | Mod: GE

## 2023-05-04 PROCEDURE — G0463: CPT

## 2023-05-04 NOTE — ASSESSMENT
[FreeTextEntry1] : 51M PMH CAD s/p PCI to LCx (2014) c/b in-stent stenosis needed stent-in-stent PCI, active smoker and hx prediabetes (HbA1c 6.1% 10/2021) presenting for lower oblique pain likely 2/2 MSK strain. \par \par #R lower oblique pain \par - dull, 6/10, radiates occasionally to front abdomen and R back, a/w positional changes, and not a/w known trauma\par - no dysuria, hematuria, frequency, abdominal pain, N/V/D/C, bloody stools, F/C, lower extremity numbness/tingling or weakness\par - no personal or family h/o renal or abdominal disease\par - advised conservative management with heat packs, Tylenol if needed, and to maintain mobility\par - RTC if worsening pain, or go to ED if new F/C, N/V/D, abdominal pain, blood stool, dysuria, hematuria, LE weakness/numbness.\par \par D/w Dr. Vazquez

## 2023-05-04 NOTE — REVIEW OF SYSTEMS
[Muscle Pain] : muscle pain [Fever] : no fever [Chills] : no chills [Chest Pain] : no chest pain [Shortness Of Breath] : no shortness of breath [Abdominal Pain] : no abdominal pain [Nausea] : no nausea [Constipation] : no constipation [Diarrhea] : no diarrhea [Vomiting] : no vomiting [Melena] : no melena [Dysuria] : no dysuria [Hematuria] : no hematuria [Frequency] : no frequency [Muscle Weakness] : no muscle weakness

## 2023-05-04 NOTE — HISTORY OF PRESENT ILLNESS
[FreeTextEntry8] : 51M PMH CAD s/p PCI to LCx (2014) c/b in-stent stenosis needed stent-in-stent PCI, active smoker and hx prediabetes (HbA1c 6.1% 10/2021) presenting for lower back pain. \par \par States pain is dull, 6/10 R sided, radiates occasionally to front abdomen and R back, a/w positional changes, and not a/w known trauma. Denies dysuria, hematuria, frequency, abdominal pain, N/V/D/C, bloody stools, F/C, lower extremity numbness/tingling or weakness, CP, SOB. Pt has no personal or family h/o renal or abdominal disease. He has not tried anything for pain, says it "just feels weird". 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

## 2023-05-04 NOTE — PHYSICAL EXAM
[Pedal Pulses Present] : the pedal pulses are present [No Edema] : there was no peripheral edema [Soft] : abdomen soft [Non Tender] : non-tender [Non-distended] : non-distended [No Masses] : no abdominal mass palpated [Normal Bowel Sounds] : normal bowel sounds [Coordination Grossly Intact] : coordination grossly intact [Normal] : affect was normal and insight and judgment were intact [de-identified] : Rovsing and obturator signs negative, no guarding [de-identified] : No paraspinal tenderness, no CVA tenderness

## 2023-05-05 DIAGNOSIS — M54.9 DORSALGIA, UNSPECIFIED: ICD-10-CM

## 2023-08-30 ENCOUNTER — RX RENEWAL (OUTPATIENT)
Age: 53
End: 2023-08-30

## 2023-09-14 ENCOUNTER — APPOINTMENT (OUTPATIENT)
Dept: CARDIOLOGY | Facility: HOSPITAL | Age: 53
End: 2023-09-14

## 2023-10-04 ENCOUNTER — RX RENEWAL (OUTPATIENT)
Age: 53
End: 2023-10-04

## 2023-10-25 ENCOUNTER — APPOINTMENT (OUTPATIENT)
Dept: CARDIOLOGY | Facility: HOSPITAL | Age: 53
End: 2023-10-25

## 2023-10-25 ENCOUNTER — OUTPATIENT (OUTPATIENT)
Dept: OUTPATIENT SERVICES | Facility: HOSPITAL | Age: 53
LOS: 1 days | End: 2023-10-25
Payer: MEDICAID

## 2023-10-25 ENCOUNTER — NON-APPOINTMENT (OUTPATIENT)
Age: 53
End: 2023-10-25

## 2023-10-25 VITALS
WEIGHT: 220 LBS | HEART RATE: 67 BPM | BODY MASS INDEX: 30.8 KG/M2 | DIASTOLIC BLOOD PRESSURE: 81 MMHG | SYSTOLIC BLOOD PRESSURE: 128 MMHG | OXYGEN SATURATION: 95 % | HEIGHT: 71 IN

## 2023-10-25 DIAGNOSIS — I25.10 ATHEROSCLEROTIC HEART DISEASE OF NATIVE CORONARY ARTERY W/OUT ANGINA PECTORIS: ICD-10-CM

## 2023-10-25 DIAGNOSIS — E78.5 HYPERLIPIDEMIA, UNSPECIFIED: ICD-10-CM

## 2023-10-25 DIAGNOSIS — Z71.6 TOBACCO ABUSE COUNSELING: ICD-10-CM

## 2023-10-25 DIAGNOSIS — I10 ESSENTIAL (PRIMARY) HYPERTENSION: ICD-10-CM

## 2023-10-25 DIAGNOSIS — I25.10 ATHEROSCLEROTIC HEART DISEASE OF NATIVE CORONARY ARTERY WITHOUT ANGINA PECTORIS: ICD-10-CM

## 2023-10-25 PROCEDURE — G0463: CPT

## 2023-10-25 PROCEDURE — 93005 ELECTROCARDIOGRAM TRACING: CPT

## 2023-10-25 RX ORDER — METOPROLOL SUCCINATE 50 MG/1
50 TABLET, EXTENDED RELEASE ORAL DAILY
Qty: 1 | Refills: 5 | Status: ACTIVE | COMMUNITY
Start: 2018-01-16 | End: 1900-01-01

## 2023-10-25 RX ORDER — ATORVASTATIN CALCIUM 80 MG/1
80 TABLET, FILM COATED ORAL
Qty: 1 | Refills: 5 | Status: ACTIVE | COMMUNITY
Start: 2022-02-22 | End: 1900-01-01

## 2023-10-26 DIAGNOSIS — I10 ESSENTIAL (PRIMARY) HYPERTENSION: ICD-10-CM

## 2023-10-26 DIAGNOSIS — Z71.6 TOBACCO ABUSE COUNSELING: ICD-10-CM

## 2023-10-26 DIAGNOSIS — E78.5 HYPERLIPIDEMIA, UNSPECIFIED: ICD-10-CM

## 2023-11-13 ENCOUNTER — LABORATORY RESULT (OUTPATIENT)
Age: 53
End: 2023-11-13

## 2024-03-18 ENCOUNTER — APPOINTMENT (OUTPATIENT)
Dept: OPHTHALMOLOGY | Facility: CLINIC | Age: 54
End: 2024-03-18
Payer: MEDICAID

## 2024-03-18 ENCOUNTER — NON-APPOINTMENT (OUTPATIENT)
Age: 54
End: 2024-03-18

## 2024-03-18 PROCEDURE — 92004 COMPRE OPH EXAM NEW PT 1/>: CPT | Mod: 25

## 2024-03-18 PROCEDURE — 92134 CPTRZ OPH DX IMG PST SGM RTA: CPT

## 2024-09-12 ENCOUNTER — EMERGENCY (EMERGENCY)
Facility: HOSPITAL | Age: 54
LOS: 1 days | Discharge: HOME | End: 2024-09-12
Attending: EMERGENCY MEDICINE
Payer: MEDICAID

## 2024-09-12 ENCOUNTER — NON-APPOINTMENT (OUTPATIENT)
Age: 54
End: 2024-09-12

## 2024-09-12 VITALS
SYSTOLIC BLOOD PRESSURE: 157 MMHG | HEIGHT: 71 IN | WEIGHT: 220.02 LBS | OXYGEN SATURATION: 99 % | RESPIRATION RATE: 16 BRPM | DIASTOLIC BLOOD PRESSURE: 89 MMHG | TEMPERATURE: 99 F | HEART RATE: 84 BPM

## 2024-09-12 VITALS
RESPIRATION RATE: 18 BRPM | OXYGEN SATURATION: 100 % | TEMPERATURE: 98 F | SYSTOLIC BLOOD PRESSURE: 130 MMHG | DIASTOLIC BLOOD PRESSURE: 85 MMHG | HEART RATE: 80 BPM

## 2024-09-12 DIAGNOSIS — Z98.89 OTHER SPECIFIED POSTPROCEDURAL STATES: Chronic | ICD-10-CM

## 2024-09-12 LAB
ALBUMIN SERPL ELPH-MCNC: 4 G/DL — SIGNIFICANT CHANGE UP (ref 3.3–5)
ALP SERPL-CCNC: 66 U/L — SIGNIFICANT CHANGE UP (ref 40–120)
ALT FLD-CCNC: 33 U/L — SIGNIFICANT CHANGE UP (ref 10–45)
ANION GAP SERPL CALC-SCNC: 9 MMOL/L — SIGNIFICANT CHANGE UP (ref 5–17)
AST SERPL-CCNC: 20 U/L — SIGNIFICANT CHANGE UP (ref 10–40)
BASOPHILS # BLD AUTO: 0.02 K/UL — SIGNIFICANT CHANGE UP (ref 0–0.2)
BASOPHILS NFR BLD AUTO: 0.2 % — SIGNIFICANT CHANGE UP (ref 0–2)
BILIRUB SERPL-MCNC: 0.3 MG/DL — SIGNIFICANT CHANGE UP (ref 0.2–1.2)
BUN SERPL-MCNC: 14 MG/DL — SIGNIFICANT CHANGE UP (ref 7–23)
CALCIUM SERPL-MCNC: 9.3 MG/DL — SIGNIFICANT CHANGE UP (ref 8.4–10.5)
CHLORIDE SERPL-SCNC: 109 MMOL/L — HIGH (ref 96–108)
CO2 SERPL-SCNC: 23 MMOL/L — SIGNIFICANT CHANGE UP (ref 22–31)
CREAT SERPL-MCNC: 0.96 MG/DL — SIGNIFICANT CHANGE UP (ref 0.5–1.3)
CRP SERPL-MCNC: <3 MG/L — SIGNIFICANT CHANGE UP (ref 0–4)
EGFR: 93 ML/MIN/1.73M2 — SIGNIFICANT CHANGE UP
EOSINOPHIL # BLD AUTO: 0.21 K/UL — SIGNIFICANT CHANGE UP (ref 0–0.5)
EOSINOPHIL NFR BLD AUTO: 2.2 % — SIGNIFICANT CHANGE UP (ref 0–6)
GLUCOSE SERPL-MCNC: 95 MG/DL — SIGNIFICANT CHANGE UP (ref 70–99)
HCT VFR BLD CALC: 44.6 % — SIGNIFICANT CHANGE UP (ref 39–50)
HGB BLD-MCNC: 14.9 G/DL — SIGNIFICANT CHANGE UP (ref 13–17)
IMM GRANULOCYTES NFR BLD AUTO: 0.2 % — SIGNIFICANT CHANGE UP (ref 0–0.9)
LYMPHOCYTES # BLD AUTO: 2.65 K/UL — SIGNIFICANT CHANGE UP (ref 1–3.3)
LYMPHOCYTES # BLD AUTO: 27.2 % — SIGNIFICANT CHANGE UP (ref 13–44)
MCHC RBC-ENTMCNC: 31.2 PG — SIGNIFICANT CHANGE UP (ref 27–34)
MCHC RBC-ENTMCNC: 33.4 GM/DL — SIGNIFICANT CHANGE UP (ref 32–36)
MCV RBC AUTO: 93.5 FL — SIGNIFICANT CHANGE UP (ref 80–100)
MONOCYTES # BLD AUTO: 1.32 K/UL — HIGH (ref 0–0.9)
MONOCYTES NFR BLD AUTO: 13.6 % — SIGNIFICANT CHANGE UP (ref 2–14)
NEUTROPHILS # BLD AUTO: 5.51 K/UL — SIGNIFICANT CHANGE UP (ref 1.8–7.4)
NEUTROPHILS NFR BLD AUTO: 56.6 % — SIGNIFICANT CHANGE UP (ref 43–77)
NRBC # BLD: 0 /100 WBCS — SIGNIFICANT CHANGE UP (ref 0–0)
PLATELET # BLD AUTO: 219 K/UL — SIGNIFICANT CHANGE UP (ref 150–400)
POTASSIUM SERPL-MCNC: 4.1 MMOL/L — SIGNIFICANT CHANGE UP (ref 3.5–5.3)
POTASSIUM SERPL-SCNC: 4.1 MMOL/L — SIGNIFICANT CHANGE UP (ref 3.5–5.3)
PROT SERPL-MCNC: 7.8 G/DL — SIGNIFICANT CHANGE UP (ref 6–8.3)
RBC # BLD: 4.77 M/UL — SIGNIFICANT CHANGE UP (ref 4.2–5.8)
RBC # FLD: 14.2 % — SIGNIFICANT CHANGE UP (ref 10.3–14.5)
SODIUM SERPL-SCNC: 141 MMOL/L — SIGNIFICANT CHANGE UP (ref 135–145)
WBC # BLD: 9.73 K/UL — SIGNIFICANT CHANGE UP (ref 3.8–10.5)
WBC # FLD AUTO: 9.73 K/UL — SIGNIFICANT CHANGE UP (ref 3.8–10.5)

## 2024-09-12 PROCEDURE — 99284 EMERGENCY DEPT VISIT MOD MDM: CPT | Mod: 25

## 2024-09-12 PROCEDURE — 86140 C-REACTIVE PROTEIN: CPT

## 2024-09-12 PROCEDURE — 70450 CT HEAD/BRAIN W/O DYE: CPT | Mod: MC

## 2024-09-12 PROCEDURE — 86789 WEST NILE VIRUS ANTIBODY: CPT

## 2024-09-12 PROCEDURE — 85014 HEMATOCRIT: CPT

## 2024-09-12 PROCEDURE — 82803 BLOOD GASES ANY COMBINATION: CPT

## 2024-09-12 PROCEDURE — 80053 COMPREHEN METABOLIC PANEL: CPT

## 2024-09-12 PROCEDURE — 85652 RBC SED RATE AUTOMATED: CPT

## 2024-09-12 PROCEDURE — 82435 ASSAY OF BLOOD CHLORIDE: CPT

## 2024-09-12 PROCEDURE — 84132 ASSAY OF SERUM POTASSIUM: CPT

## 2024-09-12 PROCEDURE — 83520 IMMUNOASSAY QUANT NOS NONAB: CPT

## 2024-09-12 PROCEDURE — 83605 ASSAY OF LACTIC ACID: CPT

## 2024-09-12 PROCEDURE — 70450 CT HEAD/BRAIN W/O DYE: CPT | Mod: 26,MC

## 2024-09-12 PROCEDURE — 82947 ASSAY GLUCOSE BLOOD QUANT: CPT

## 2024-09-12 PROCEDURE — 85018 HEMOGLOBIN: CPT

## 2024-09-12 PROCEDURE — 99285 EMERGENCY DEPT VISIT HI MDM: CPT

## 2024-09-12 PROCEDURE — 82164 ANGIOTENSIN I ENZYME TEST: CPT

## 2024-09-12 PROCEDURE — 87476 LYME DIS DNA AMP PROBE: CPT

## 2024-09-12 PROCEDURE — 82962 GLUCOSE BLOOD TEST: CPT

## 2024-09-12 PROCEDURE — 82330 ASSAY OF CALCIUM: CPT

## 2024-09-12 PROCEDURE — 86788 WEST NILE VIRUS AB IGM: CPT

## 2024-09-12 PROCEDURE — 85025 COMPLETE CBC W/AUTO DIFF WBC: CPT

## 2024-09-12 PROCEDURE — 86618 LYME DISEASE ANTIBODY: CPT

## 2024-09-12 PROCEDURE — 87798 DETECT AGENT NOS DNA AMP: CPT

## 2024-09-12 PROCEDURE — 84295 ASSAY OF SERUM SODIUM: CPT

## 2024-09-12 PROCEDURE — 36415 COLL VENOUS BLD VENIPUNCTURE: CPT

## 2024-09-12 RX ORDER — VALACYCLOVIR 500 MG/1
1000 TABLET, FILM COATED ORAL ONCE
Refills: 0 | Status: COMPLETED | OUTPATIENT
Start: 2024-09-12 | End: 2024-09-12

## 2024-09-12 RX ORDER — PREDNISONE 10 MG
3 TABLET, DOSE PACK ORAL
Qty: 21 | Refills: 0
Start: 2024-09-12 | End: 2024-09-18

## 2024-09-12 RX ORDER — VALACYCLOVIR 500 MG/1
1 TABLET, FILM COATED ORAL
Qty: 21 | Refills: 0
Start: 2024-09-12 | End: 2024-09-18

## 2024-09-12 RX ORDER — PREDNISONE 10 MG
60 TABLET, DOSE PACK ORAL ONCE
Refills: 0 | Status: COMPLETED | OUTPATIENT
Start: 2024-09-12 | End: 2024-09-12

## 2024-09-12 RX ADMIN — VALACYCLOVIR 1000 MILLIGRAM(S): 500 TABLET, FILM COATED ORAL at 18:04

## 2024-09-12 RX ADMIN — Medication 60 MILLIGRAM(S): at 18:04

## 2024-09-12 NOTE — ED PROVIDER NOTE - NSICDXPASTSURGICALHX_GEN_ALL_CORE_FT
PAST SURGICAL HISTORY:  Percutaneous transluminal coronary angioplasty (PTCA) within last 14 to 24 months with STENT of LAD

## 2024-09-12 NOTE — ED CLERICAL - NS ED CLERK NOTE PRE-ARRIVAL INFORMATION; ADDITIONAL PRE-ARRIVAL INFORMATION
CC/Reason For referral: concern for stroke  Preferred Consultant(if applicable): neurology  Who admits for you (if needed):  Do you have documents you would like to fax over?  Would you still like to speak to an ED attending?  please call after patient is seen

## 2024-09-12 NOTE — ED PROVIDER NOTE - PATIENT PORTAL LINK FT
You can access the FollowMyHealth Patient Portal offered by [facility] by registering at the following website: [email or web address]. By joining [employer]’s FollowMyHealth portal, you will also be able to view your health information using other applications (apps) compatible with our system.

## 2024-09-12 NOTE — ED ADULT TRIAGE NOTE - BMI (KG/M2)
30.7 Rifampin Pregnancy And Lactation Text: This medication is Pregnancy Category C and it isn't know if it is safe during pregnancy. It is also excreted in breast milk and should not be used if you are breast feeding.

## 2024-09-12 NOTE — ED PROVIDER NOTE - OBJECTIVE STATEMENT
Galindo CONNER: Patient is a 54-year-old male with a history of  prediabetes mellitus, hypertension,  hyperlipidemia, CAD s/p stent 2014, current daily smoker (1.5 PPD) here for evaluation of left-sided facial droop, first noted at 11:30 AM.  A code stroke was called.  Patient was evaluated by neurology team and ED team.  Accordingly, patient states that he did have a headache on the left side of the back of his head about 2 days ago.  Headache resolved without any intervention.  He estimates it was 3 out of 10 at the time.  Today, when eating lunch he felt that food was dripping from the left side of his mouth.  He states he took a nap and woke up and then noticed that he is unable to close his left eye.  Patient denies any other focal weakness.  No change in gait or speech.  He denies any recent illnesses.  No fevers, chills, nausea, vomiting.  No recent episodes of diarrhea.  Denies any travel or camping.  No recent rashes.  Patient denies any change in his hearing.  He denies any ear pain.  Based on the assessment of the ED team and neurology team, code stroke was canceled.  Neurology to continue to see patient as a consult.  CT head was ordered given patient's recent headache.

## 2024-09-12 NOTE — ED PROVIDER NOTE - NSICDXPASTMEDICALHX_GEN_ALL_CORE_FT
PAST MEDICAL HISTORY:  Alcohol abuse     Angioedema     Anxiety     Coronary artery disease, occlusive     Current smoker     Depression     Hyperlipidemia     Hypertension     Paranoia

## 2024-09-12 NOTE — CONSULT NOTE ADULT - ASSESSMENT
Assessment: 55yo M with PMH of HTN, CAD s/p stent 2014, preDM, chronic smoker(1.5PPD) comes in as a code stroke for left facial weakness and droop. Code stroke was cancelled as appears to be peripheral CN VII palsy. He started having sharp headache 2 days ago which startyed back of his head and radiated to the front, mostly on the left side, 8-9/10, resolved on its own. Starting 1130 9/12 he noticed his left mouth felt heavy while chewing and he couldn't close his left eye, he felt weak and numb on his left face. Mentions mild slurring of speech due to feeling heavy. Denies blurry vision, dizziness, gait instability, limb weakness or numbness, denies headache, ear pain or fullness, denies recent fever, chills, rash or recent travel hx or tick bite. Denies previous hx of stroke, seizures, migraine. 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     Recs:  [] Empiric facial nerve palsy treatment: Prednisone 60mg x 7 days, as well as Valtrex 100mg TID x 1 week  [] Facial nerve palsy serum workup: Lyme PCR, Lyme IgG/IgM, ACE, IL2R, WNV, ESR, CRP  [] Pt should pursue expeditious outpatient follow up with neurology for further workup, including MRI. If needs neurologist, can schedule appointment with office of Dr. Johnson ([phone])  [] can get MRI brain outpatient   Assessment: 55yo M with PMH of HTN, CAD s/p stent 2014, preDM, chronic smoker(1.5PPD) comes in as a code stroke for left facial weakness and droop. Code stroke was cancelled as appears to be peripheral CN VII palsy. He started having sharp headache 2 days ago which startyed back of his head and radiated to the front, mostly on the left side, 8-9/10, resolved on its own. Starting 1130 9/12 he noticed his left mouth felt heavy while chewing and he couldn't close his left eye, he felt weak and numb on his left face. Mentions mild slurring of speech due to feeling heavy. Denies blurry vision, dizziness, gait instability, limb weakness or numbness, denies headache, ear pain or fullness, denies recent fever, chills, rash or recent travel hx or tick bite. Denies previous hx of stroke, seizures, migraine. 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     House Brackman score appears to be III/IV    Recs:  [] Empiric facial nerve palsy treatment: Prednisone 60mg x 7 days, as well as Valtrex 100mg TID x 1 week  [] Facial nerve palsy serum workup: Lyme PCR, Lyme IgG/IgM, ACE, IL2R, WNV, ESR, CRP  [] Pt should pursue expeditious outpatient follow up with neurology for further workup, including MRI. If needs neurologist, can schedule appointment with office of Dr. Johnson ([phone])  [] can get MRI brain outpatient    Case to be discussed with attending Dr. Cifuentes    ***INCOMPLETE NOTE*** Assessment: 53yo M with PMH of HTN, CAD s/p stent 2014, preDM, chronic smoker(1.5PPD) comes in as a code stroke for left facial weakness and droop. Code stroke was cancelled as appears to be peripheral CN VII palsy. He started having sharp headache 2 days ago which startyed back of his head and radiated to the front, mostly on the left side, 8-9/10, resolved on its own. Starting 1130 9/12 he noticed his left mouth felt heavy while chewing and he couldn't close his left eye, he felt weak and numb on his left face. Mentions mild slurring of speech due to feeling heavy. Denies blurry vision, dizziness, gait instability, limb weakness or numbness, denies headache, ear pain or fullness, denies recent fever, chills, rash or recent travel hx or tick bite. Denies previous hx of stroke, seizures, migraine. 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     House Brackman score appears to be III/IV    Recs:  [] Empiric facial nerve palsy treatment: Prednisone 60mg x 7 days, as well as Valtrex 1000mg TID x 1 week  [] Facial nerve palsy serum workup: Lyme PCR, Lyme IgG/IgM, ACE, IL2R, WNV, ESR, CRP  [] Pt should pursue expeditious outpatient follow up with neurology for further workup, including MRI. If needs neurologist, can schedule appointment with office of Dr. Johnson ([phone])    Case to be discussed with attending Dr. Cifuentes     Assessment: 53yo M with PMH of HTN, CAD s/p stent 2014, preDM, chronic smoker(1.5PPD) comes in as a code stroke for left facial weakness and droop. Code stroke was cancelled as appears to be peripheral CN VII palsy. He started having sharp headache 2 days ago which startyed back of his head and radiated to the front, mostly on the left side, 8-9/10, resolved on its own. Starting 1130 9/12 he noticed his left mouth felt heavy while chewing and he couldn't close his left eye, he felt weak and numb on his left face. Mentions mild slurring of speech due to feeling heavy. he also mentions his taste feels funny. Denies blurry vision, dizziness, gait instability, limb weakness or numbness, denies headache, ear pain or fullness, denies recent fever, chills, rash or recent travel hx or tick bite. Denies previous hx of stroke, seizures, migraine. 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     House Brackman score appears to be III/IV    Recs:  [] Empiric facial nerve palsy treatment: Prednisone 60mg x 7 days, as well as Valtrex 1000mg TID x 1 week  [] Facial nerve palsy serum workup: Lyme PCR, Lyme IgG/IgM, ACE, IL2R, WNV, ESR, CRP  [] Pt should pursue expeditious outpatient follow up with neurology for further workup, including MRI. If needs neurologist, can schedule appointment with office of Dr. Johnson ([phone])    Case to be discussed with attending Dr. Cifuentes

## 2024-09-12 NOTE — CONSULT NOTE ADULT - SUBJECTIVE AND OBJECTIVE BOX
Neurology - Consult Note    -  Spectra: [phone] ([facility]), [phone] ([facility])  -    HPI: 55yo M with PMH of HTN, CAD s/p stent 2014, preDM, chronic smoker(1.5PPD) comes in as a code stroke for left facial weakness and droop. Code stroke was cancelled as appears to be peripheral CN VII palsy. He started having sharp headache 2 days ago which startyed back of his head and radiated to the front, mostly on the left side, 8-9/10, resolved on its own. Starting 1130 9/12 he noticed his left mouth felt heavy while chewing and he couldn't close his left eye, he felt weak and numb on his left face. Mentions mild slurring of speech due to feeling heavy. Denies blurry vision, dizziness, gait instability, limb weakness or numbness, denies headache, ear pain or fullness, denies recent fever, chills, rash or recent travel hx or tick bite. Denies previous hx of stroke, seizures, migraine. CT scan pending    All other review of systems is negative unless indicated above.    Allergies:  ACE inhibitors (Angioedema)      PMHx/PSHx/Family Hx: As above, otherwise see below   Hyperlipidemia    Depression    Hypertension    Coronary artery disease, occlusive    Anxiety    Alcohol abuse    Angioedema    Current smoker    Paranoia        Social Hx:  current use of tobacco, alcohol 1.5 PPD    Medications:  MEDICATIONS  (STANDING):    MEDICATIONS  (PRN):      --------------------------------------------------------------------------------------------------------------------------------------------------------------------------------------------------------------------    Vitals:  T(C): 37.1 (09-12-24 @ 15:41), Max: 37.1 (09-12-24 @ 15:41)  HR: 84 (09-12-24 @ 15:41) (84 - 84)  BP: 157/89 (09-12-24 @ 15:41) (157/89 - 157/89)  RR: 16 (09-12-24 @ 15:41) (16 - 16)  SpO2: 99% (09-12-24 @ 15:41) (99% - 99%)    PHYSICAL EXAM:     General - NAD  Cardiovascular - Peripheral pulses palpable, no edema    NEURO:    Mental status - Awake, Alert, Oriented to person, place, and time. Speech fluent, repetition and naming intact. Follows simple and complex commands.     Cranial nerves -   PERRL, VFF, EOMI,   face sensation (V1-V3) intact b/l,   weakness in left eyebrow elevation, eye closure, left angle of mouth droop, mild flattening of left NL fold as compared to right  hearing intact b/l,   palate with symmetric elevation,   trapezius 5/5 strength b/l,   tongue midline on protrusion with full lateral movement    Motor - Normal bulk and tone throughout. No pronator drift.  Strength testing            Deltoid      Biceps      Triceps     Wrist Extension    Wrist Flexion     Interossei         R            5                 5               5                     5                              5                        5                 5  L             5                 5               5                     5                              5                        5                 5              Hip Flexion    Hip Extension    Knee Flexion    Knee Extension    Dorsiflexion    Plantar Flexion  R              5                           5                       5                           5                            5                          5  L              5                           5                        5                           5                            5                          5    Sensation - Light touch intact throughout    DTR's -             Biceps      Triceps     Brachioradialis      Patellar    Ankle    Toes/plantar response  R             2+             2+                  2+                       2+            2+                 Down  L              2+             2+                 2+                        2+           2+                 Down    Coordination - Finger to Nose intact b/l. Heel to Christianson intact b/l. No tremors appreciated    Gait and station - Normal casual gait. Romberg (-)    ---------------------------------------------------------------------------------------------------------------------------------------------------------------------------------------------------------------------------    Labs:          CAPILLARY BLOOD GLUCOSE  125 (12 Sep 2024 16:04)      POCT Blood Glucose.: 125 mg/dL (12 Sep 2024 15:41)          CSF:                  Radiology:

## 2024-09-12 NOTE — ED PROVIDER NOTE - GASTROINTESTINAL NEGATIVE STATEMENT, MLM
no recurring infections/no persistent infections no abdominal pain, no bloating, no constipation, no diarrhea, no nausea and no vomiting.

## 2024-09-12 NOTE — ED PROVIDER NOTE - NSFOLLOWUPINSTRUCTIONS_ED_ALL_ED_FT
You were seen in the emergency department today with left-sided facial droop.  Your exam is consistent with Bell's palsy.  Please take the steroids and antiviral medication as prescribed.  Please follow-up with your PCP within 1 week.  Please return to the emergency department with any worsening symptoms.    Chun Palsy    WHAT YOU NEED TO KNOW:    What is Bell palsy? Bell palsy is a sudden weakness or paralysis of one side of your face. It occurs when the nerve that controls the muscles in your face becomes swollen or irritated. Bell palsy usually lasts about 2 to 3 weeks, but it can last for up to 6 months. Bell palsy can be permanent for some people. The cause of Bell palsy is not clear.  Chun Palsy    What increases my risk for Bell palsy?    Age 15 to 45 years    Pregnancy or preeclampsia (high blood pressure that develops because of pregnancy)    A virus, such as a cold, the flu, herpes simplex, or varicella (chicken pox)    Obesity    High blood pressure    Stress, lack of sleep, injury, or a short illness    A condition such as lupus, Lyme disease, Sjögren syndrome, or diabetes  What are the signs and symptoms of Bell palsy? You may first have pain behind an ear or in your face. Hours or days later, you may have any of the following on the same side of your face:    Weakness or paralysis in your face    Not being able to move your eyebrow or wrinkle your forehead    Trouble closing your eye or blinking, or your eye moves up when you try to close your eyelid    Changes in the amount of tears and saliva you make, such as dry eyes or drooling    Mouth drooping and trouble smiling or chewing    Loss of taste at the front part of your tongue    Sensitive hearing  How is Bell palsy diagnosed? Your healthcare provider will examine you and ask about your medical history. Tell your provider about your symptoms and when they started. Your provider will test how well you can move the muscles in your face. Your provider will need to rule out other causes of paralysis, such as a stroke. Some stroke and Bell palsy symptoms are similar, but only Bell palsy prevents movement of forehead muscles. Bell palsy only affects your face. You may also need any of the following:    An electromyography (EMG) may be used to measure the electrical activity of your muscles. An EMG also tests the nerves that control muscles.    A CT or MRI of your brain may be done to rule out other causes of your paralysis. You may be given contrast liquid to help your brain show up better in the pictures. Tell the healthcare provider if you have ever had an allergic reaction to contrast liquid. Do not enter the MRI room with anything metal. Metal can cause serious injury. Tell the provider if you have any metal in or on your body.  How is Bell palsy treated? Bell palsy often goes away without treatment. Some treatments may help you get better faster or help prevent other problems caused by Bell palsy. You may need any of the following:    Steroids may be given to decrease swelling and irritation of the nerve in your face. Your symptoms can go away faster if you get steroids 72 hours from when your paralysis started.    Antiviral medicine may be given if your provider thinks a virus caused your Bell palsy.    Acetaminophen decreases pain and fever. It is available without a doctor's order. Ask how much to take and how often to take it. Follow directions. Read the labels of all other medicines you are using to see if they also contain acetaminophen, or ask your doctor or pharmacist. Acetaminophen can cause liver damage if not taken correctly.    NSAIDs, such as ibuprofen, help decrease swelling, pain, and fever. This medicine is available with or without a doctor's order. NSAIDs can cause stomach bleeding or kidney problems in certain people. If you take blood thinner medicine, always ask your healthcare provider if NSAIDs are safe for you. Always read the medicine label and follow directions.  What else can I do to help manage Bell palsy?    Eye care may be needed to prevent vision changes, eye damage, and infection. Use eye drops during the day and an ointment at night, as directed. You may need to wear an eye patch during the day. Wear sunglasses to protect your eye from direct sunlight. Stay away from places that have particles in the air that may harm your eye. You may also need to tape your eye shut while you sleep. Get your eye checked as directed if your symptoms last longer than 3 weeks.  Eye Patch      Eat soft foods that are easy to chew and swallow. These foods may be chopped, ground, mashed, pureed, and moist. Do not eat hard or chewy foods. These foods may fall out of your mouth where it droops. Ask your healthcare provider or dietitian about the foods you should eat.    Go to speech therapy if you have trouble eating or drinking that continues longer than 3 weeks. A speech therapist can teach you new ways to eat and drink. The therapist can show you ways to prevent or manage problems with drooling or swallowing. You may also learn to plan several small meals instead of a few large meals each day.    Use ear plugs or ear protectors around loud noises, such as a lawnmower or loud music. Foam earplugs that completely block your ear canal can help decrease your sensitive hearing. Do not listen to loud music through headphones or earphones.    Go to physical therapy as directed. A physical therapist can teach you how to massage and exercise the muscles in your face. These exercises may help prevent long-term problems such as muscle spasms and permanent paralysis in your face.    Talk to a mental health therapist if your symptoms are causing a low mood or anxiety. The therapist can help you cope while you recover.  When should I seek immediate care?    You have vision changes or a loss of vision.    When should I call my doctor?    You have a fever.    Your eye becomes red, irritated, or painful.    Your symptoms have not gone away after 3 weeks.    You have questions or concerns about your condition or care.  CARE AGREEMENT:

## 2024-09-12 NOTE — ED PROVIDER NOTE - NSICDXFAMILYHX_GEN_ALL_CORE_FT
FAMILY HISTORY:  Family history of alcoholism in father    Mother  Still living? Yes, Estimated age: Age Unknown  Family history of heart disease, Age at diagnosis: Age Unknown    Sibling  Still living? Unknown  Family history of alcoholism in brother, Age at diagnosis: Age Unknown

## 2024-09-12 NOTE — ED ADULT NURSE NOTE - OBJECTIVE STATEMENT
53 yo male presents to the ED AAox4 ambulatory with complaints of 54 year old male presents to the ED AAox4 ambulatory with complaints of left sided facial numbness since 1130am - only involvement is facial. Denies falls or head trauma, not on anticoagulants, A&Ox4, speech is clear. Patient is awake, alert, a&ox3, following commands, strong equal strength bilaterally x 4, sensory in tact, ambulating independently with steady gait noted. code stroke activated in triage and patient assessed by neurology and ED team. 20g placed in L AC and labs drawn and sent to lab per CODE STROKE order set/ MD orders.

## 2024-09-12 NOTE — ED PROVIDER NOTE - CLINICAL SUMMARY MEDICAL DECISION MAKING FREE TEXT BOX
Galindo CONNER: Patient is a 54-year-old male with a history of  prediabetes mellitus, hypertension,  hyperlipidemia, CAD s/p stent 2014, current daily smoker (1.5 PPD) here for evaluation of left-sided facial droop, first noted at 11:30 AM.  A code stroke was called.  Patient was evaluated by neurology team and ED team.  Accordingly, patient states that he did have a headache on the left side of the back of his head about 2 days ago.  Headache resolved without any intervention.  He estimates it was 3 out of 10 at the time.  Today, when eating lunch he felt that food was dripping from the left side of his mouth.  He states he took a nap and woke up and then noticed that he is unable to close his left eye.  Patient denies any other focal weakness.  No change in gait or speech.  He denies any recent illnesses.  No fevers, chills, nausea, vomiting.  No recent episodes of diarrhea.  Denies any travel or camping.  No recent rashes.  Patient denies any change in his hearing.  He denies any ear pain.  Based on the assessment of the ED team and neurology team, code stroke was canceled.  Neurology to continue to see patient as a consult.  CT head was ordered given patient's recent headache.  on exam, patient has a cranial nerve VII palsy, consistent with Bell's palsy.  He has no other focal deficit.  Code stroke was canceled.  Neurology to give recommendations.

## 2024-09-12 NOTE — ED PROVIDER NOTE - PHYSICAL EXAMINATION
neuro: Patient unable to raise left eyebrow symmetrically with right.  He has weakness and closing his left eye.  Left facial droop noted.  Tongue is midline.  Strength is 5/5 in upper and lower extremities.  Sensation is equal bilaterally.  Finger-to-nose normal bilaterally.  Gait normal.  EOMI

## 2024-09-12 NOTE — ED PROVIDER NOTE - PROGRESS NOTE DETAILS
Art Perez MD, PGY3  Discussed with neurology, findings consistent with Bell's palsy.  Lab work sent.  Will start on prednisone, Valtrex, prescriptions sent to pharmacy.  Patient will follow-up with PCP.  Patient told to return to emergency department with any worsening symptoms.  Will DC home.  Answered all questions and gave return precautions. Galindo CONNER: Results and follow up instructions discussed with patient in detail.

## 2024-09-13 LAB
B BURGDOR C6 AB SER-ACNC: NEGATIVE — SIGNIFICANT CHANGE UP
B BURGDOR IGG+IGM SER-ACNC: 0.06 INDEX — SIGNIFICANT CHANGE UP (ref 0.01–0.9)
ERYTHROCYTE [SEDIMENTATION RATE] IN BLOOD: 34 MM/HR — HIGH (ref 0–20)

## 2024-09-15 LAB
IL2 SERPL-MCNC: 431.6 PG/ML — SIGNIFICANT CHANGE UP (ref 175.3–858.2)
WNV RNA SPEC QL NAA+PROBE: SIGNIFICANT CHANGE UP
WNV RNA SPEC QL NAA+PROBE: SIGNIFICANT CHANGE UP

## 2024-09-16 LAB — ACE SERPL-CCNC: 51 U/L — SIGNIFICANT CHANGE UP (ref 14–82)

## 2024-09-17 LAB
B BURGDOR DNA SPEC QL NAA+PROBE: NEGATIVE — SIGNIFICANT CHANGE UP
WNV IGG TITR FLD: NEGATIVE — SIGNIFICANT CHANGE UP
WNV IGM SPEC QL: NEGATIVE — SIGNIFICANT CHANGE UP

## 2024-09-20 ENCOUNTER — APPOINTMENT (OUTPATIENT)
Dept: INTERNAL MEDICINE | Facility: CLINIC | Age: 54
End: 2024-09-20
Payer: MEDICAID

## 2024-09-20 ENCOUNTER — LABORATORY RESULT (OUTPATIENT)
Age: 54
End: 2024-09-20

## 2024-09-20 ENCOUNTER — OUTPATIENT (OUTPATIENT)
Dept: OUTPATIENT SERVICES | Facility: HOSPITAL | Age: 54
LOS: 1 days | End: 2024-09-20
Payer: MEDICAID

## 2024-09-20 VITALS
WEIGHT: 220 LBS | HEART RATE: 75 BPM | SYSTOLIC BLOOD PRESSURE: 118 MMHG | HEIGHT: 71 IN | DIASTOLIC BLOOD PRESSURE: 80 MMHG | BODY MASS INDEX: 30.8 KG/M2 | OXYGEN SATURATION: 96 %

## 2024-09-20 DIAGNOSIS — G51.0 BELL'S PALSY: ICD-10-CM

## 2024-09-20 DIAGNOSIS — I10 ESSENTIAL (PRIMARY) HYPERTENSION: ICD-10-CM

## 2024-09-20 DIAGNOSIS — Z98.89 OTHER SPECIFIED POSTPROCEDURAL STATES: Chronic | ICD-10-CM

## 2024-09-20 DIAGNOSIS — E78.5 HYPERLIPIDEMIA, UNSPECIFIED: ICD-10-CM

## 2024-09-20 DIAGNOSIS — I25.10 ATHEROSCLEROTIC HEART DISEASE OF NATIVE CORONARY ARTERY W/OUT ANGINA PECTORIS: ICD-10-CM

## 2024-09-20 PROCEDURE — 85025 COMPLETE CBC W/AUTO DIFF WBC: CPT

## 2024-09-20 PROCEDURE — 99214 OFFICE O/P EST MOD 30 MIN: CPT

## 2024-09-20 PROCEDURE — 80053 COMPREHEN METABOLIC PANEL: CPT

## 2024-09-20 PROCEDURE — 85652 RBC SED RATE AUTOMATED: CPT

## 2024-09-20 PROCEDURE — 99495 TRANSJ CARE MGMT MOD F2F 14D: CPT

## 2024-09-20 RX ORDER — ESCITALOPRAM OXALATE 10 MG/1
10 TABLET ORAL
Qty: 90 | Refills: 3 | Status: ACTIVE | COMMUNITY
Start: 2024-09-20

## 2024-09-20 RX ORDER — QUETIAPINE FUMARATE 25 MG/1
25 TABLET ORAL
Qty: 30 | Refills: 0 | Status: ACTIVE | COMMUNITY
Start: 2024-09-20

## 2024-09-21 ENCOUNTER — TRANSCRIPTION ENCOUNTER (OUTPATIENT)
Age: 54
End: 2024-09-21

## 2024-09-21 DIAGNOSIS — D72.829 ELEVATED WHITE BLOOD CELL COUNT, UNSPECIFIED: ICD-10-CM

## 2024-09-21 LAB
ALBUMIN SERPL ELPH-MCNC: 4.1 G/DL
ALP BLD-CCNC: 61 U/L
ALT SERPL-CCNC: 41 U/L
ANION GAP SERPL CALC-SCNC: 14 MMOL/L
AST SERPL-CCNC: 18 U/L
BASOPHILS # BLD AUTO: 0.05 K/UL
BASOPHILS NFR BLD AUTO: 0.3 %
BILIRUB SERPL-MCNC: 0.2 MG/DL
BUN SERPL-MCNC: 18 MG/DL
CALCIUM SERPL-MCNC: 9.4 MG/DL
CHLORIDE SERPL-SCNC: 103 MMOL/L
CO2 SERPL-SCNC: 24 MMOL/L
CREAT SERPL-MCNC: 0.98 MG/DL
EGFR: 90 ML/MIN/1.73M2
EOSINOPHIL # BLD AUTO: 0.25 K/UL
EOSINOPHIL NFR BLD AUTO: 1.4 %
ERYTHROCYTE [SEDIMENTATION RATE] IN BLOOD BY WESTERGREN METHOD: 31 MM/HR
GLUCOSE SERPL-MCNC: 78 MG/DL
HCT VFR BLD CALC: 46 %
HGB BLD-MCNC: 15.5 G/DL
IMM GRANULOCYTES NFR BLD AUTO: 0.4 %
LYMPHOCYTES # BLD AUTO: 6.24 K/UL
LYMPHOCYTES NFR BLD AUTO: 34.5 %
MAN DIFF?: NORMAL
MCHC RBC-ENTMCNC: 31.1 PG
MCHC RBC-ENTMCNC: 33.7 GM/DL
MCV RBC AUTO: 92.4 FL
MONOCYTES # BLD AUTO: 1.53 K/UL
MONOCYTES NFR BLD AUTO: 8.5 %
NEUTROPHILS # BLD AUTO: 9.95 K/UL
NEUTROPHILS NFR BLD AUTO: 54.9 %
PLATELET # BLD AUTO: 259 K/UL
POTASSIUM SERPL-SCNC: 4.1 MMOL/L
PROT SERPL-MCNC: 7.1 G/DL
RBC # BLD: 4.98 M/UL
RBC # FLD: 15.8 %
SODIUM SERPL-SCNC: 141 MMOL/L
WBC # FLD AUTO: 18.1 K/UL

## 2024-09-24 ENCOUNTER — LABORATORY RESULT (OUTPATIENT)
Age: 54
End: 2024-09-24

## 2024-09-24 NOTE — REVIEW OF SYSTEMS
[FreeTextEntry2] : Constitutional:  no fever and no chills.  Eyes:  no discharge.  HEENT:  no earache.  Cardiovascular:  no chest pain, no palpitations and no lower extremity edema.  Respiratory:  no shortness of breath, no wheezing and no cough.  Gastrointestinal:  no abdominal pain, no nausea and no vomiting.  Genitourinary:  no dysuria.  Musculoskeletal:  no joint pain.  Integumentary:  no itching.  Neurological:  no headache.  Psychiatric:  not suicidal.  Hematologic/Lymphatic:  no easy bleeding. [de-identified] : see hpi

## 2024-09-24 NOTE — REVIEW OF SYSTEMS
[FreeTextEntry2] : Constitutional:  no fever and no chills.  Eyes:  no discharge.  HEENT:  no earache.  Cardiovascular:  no chest pain, no palpitations and no lower extremity edema.  Respiratory:  no shortness of breath, no wheezing and no cough.  Gastrointestinal:  no abdominal pain, no nausea and no vomiting.  Genitourinary:  no dysuria.  Musculoskeletal:  no joint pain.  Integumentary:  no itching.  Neurological:  no headache.  Psychiatric:  not suicidal.  Hematologic/Lymphatic:  no easy bleeding. [de-identified] : see hpi

## 2024-09-24 NOTE — PHYSICAL EXAM
[Normal] : normal sclera/conjunctiva, pupils are equal, round and reactive to light and extraocular movements are intact [de-identified] : WDWN in NAD HEENT:  unremarkable Neck:  supple, no JVD, no LN Lungs:  CTA B/L, no W/R/R Heart:  Reg rate, +S1S2, no M/R/G Abdomen:  soft, NT, ND, +BS, no masses, no HS-megaly Genital: No pubic or genital lesions noted. Ext:  no C/C/E Neuro:  no focal deficits [de-identified] : able to close Rt eye. [de-identified] : left tong taste sensation still decreased. mild Left facial sagging remains.  [64336 - Moderate Complexity requires multiple possible diagnoses and/or the management options, moderate complexity of the medical data (tests, etc.) to be reviewed, and moderate risk of significant complications, morbidity, and/or mortality as well as co] : Moderate Complexity

## 2024-09-24 NOTE — ASSESSMENT
[FreeTextEntry1] : Mr. FRANCESCO CERVANTES is a 54 year old White  male  with history of prediabetes mellitus, hypertension, hyperlipidemia, CAD s/p stent 2014, current daily smoker (1.5 PPD) presented today for f/u hospitalization for Bell's palsy.  # Left sides Bell's palsy/ cranial nerve 7 palsy Sx is improving after tx with Prednisone and antiviral medical tx. Pt is able to close her left eye. No need eye protection method.  # current smoker Offered Nicotine patch/gum and/or varenicline, but pt deferred it.  He expressed working on cut down smoking cigarette. Stressed increased CV risk.  Check CBC, CMP, ESR at the lab as overdue labs.  RTO for CPE with our clinic 10/21/24 with fasting for lipid lab test.

## 2024-09-24 NOTE — HISTORY OF PRESENT ILLNESS
[Post-hospitalization from ___ Hospital] : Post-hospitalization from [unfilled] Hospital [Admitted on: ___] : The patient was admitted on [unfilled] [Discharged on ___] : discharged on [unfilled] [Discharge Summary] : discharge summary [Pertinent Labs] : pertinent labs [Radiology Findings] : radiology findings [Discharge Med List] : discharge medication list [Med Reconciliation] : medication reconciliation has been completed [FreeTextEntry2] : Mr. FRANCESCO CERVANTES is a 54 year old White  male  with history of  presented today for f/u hospitalization for Bell's palsy.  [brief hospital course] Patient is a 54-year-old male with a history of prediabetes mellitus, hypertension, hyperlipidemia, CAD s/p stent 2014, current daily smoker (1.5 PPD) here for evaluation of left-sided facial droop, first noted at 11:30 AM. A code stroke was called. Patient was evaluated by neurology team and ED team. Accordingly, patient states that he did have a headache on the left side of the back of his head about 2 days ago. Headache resolved without any intervention. He estimates it was 3 out of 10 at the time. Today, when eating lunch he felt that food was dripping from the left side of his mouth. He states he took a nap and woke up and then noticed that he is unable to close his left eye. Patient denies any other focal weakness. No change in gait or speech. He denies any recent illnesses. No fevers, chills, nausea, vomiting. No recent episodes of diarrhea. Denies any travel or camping. No recent rashes. Patient denies any change in his hearing. He denies any ear pain. Based on the assessment of the ED team and neurology team, code stroke was canceled. Neurology to continue to see patient as a consult. CT head was ordered given patient's recent headache. on exam, patient has a cranial nerve VII palsy, consistent with Bell's palsy. He has no other focal deficit. Code stroke was canceled. Neurology to give recommendations.  Today he came alone. He tells me that he finished up prednisone 20mg 3 tabs po qd for 7 days and Valtrex 1gm po tid for 7 days by yesterday. He reports since Monday he could see the Bell's palsy sx improvement. Had brief Lip area spasms 3 times yesterday. Now he can close his left eye( still weaker than Rt eye in terms of closing tight) and left side sagging face got milder. Denies vision change, headache, 4 extremities weakness.

## 2024-09-24 NOTE — REVIEW OF SYSTEMS
[FreeTextEntry2] : Constitutional:  no fever and no chills.  Eyes:  no discharge.  HEENT:  no earache.  Cardiovascular:  no chest pain, no palpitations and no lower extremity edema.  Respiratory:  no shortness of breath, no wheezing and no cough.  Gastrointestinal:  no abdominal pain, no nausea and no vomiting.  Genitourinary:  no dysuria.  Musculoskeletal:  no joint pain.  Integumentary:  no itching.  Neurological:  no headache.  Psychiatric:  not suicidal.  Hematologic/Lymphatic:  no easy bleeding. [de-identified] : see hpi

## 2024-09-24 NOTE — PHYSICAL EXAM
[Normal] : normal sclera/conjunctiva, pupils are equal, round and reactive to light and extraocular movements are intact [de-identified] : WDWN in NAD HEENT:  unremarkable Neck:  supple, no JVD, no LN Lungs:  CTA B/L, no W/R/R Heart:  Reg rate, +S1S2, no M/R/G Abdomen:  soft, NT, ND, +BS, no masses, no HS-megaly Genital: No pubic or genital lesions noted. Ext:  no C/C/E Neuro:  no focal deficits [de-identified] : able to close Rt eye. [de-identified] : left tong taste sensation still decreased. mild Left facial sagging remains.  [04010 - Moderate Complexity requires multiple possible diagnoses and/or the management options, moderate complexity of the medical data (tests, etc.) to be reviewed, and moderate risk of significant complications, morbidity, and/or mortality as well as co] : Moderate Complexity

## 2024-09-24 NOTE — PHYSICAL EXAM
[Normal] : normal sclera/conjunctiva, pupils are equal, round and reactive to light and extraocular movements are intact [de-identified] : WDWN in NAD HEENT:  unremarkable Neck:  supple, no JVD, no LN Lungs:  CTA B/L, no W/R/R Heart:  Reg rate, +S1S2, no M/R/G Abdomen:  soft, NT, ND, +BS, no masses, no HS-megaly Genital: No pubic or genital lesions noted. Ext:  no C/C/E Neuro:  no focal deficits [de-identified] : able to close Rt eye. [de-identified] : left tong taste sensation still decreased. mild Left facial sagging remains.  [72666 - Moderate Complexity requires multiple possible diagnoses and/or the management options, moderate complexity of the medical data (tests, etc.) to be reviewed, and moderate risk of significant complications, morbidity, and/or mortality as well as co] : Moderate Complexity

## 2024-10-01 DIAGNOSIS — I25.10 ATHEROSCLEROTIC HEART DISEASE OF NATIVE CORONARY ARTERY WITHOUT ANGINA PECTORIS: ICD-10-CM

## 2024-10-01 DIAGNOSIS — E78.5 HYPERLIPIDEMIA, UNSPECIFIED: ICD-10-CM

## 2024-10-01 DIAGNOSIS — G51.0 BELL'S PALSY: ICD-10-CM

## 2024-10-21 ENCOUNTER — MED ADMIN CHARGE (OUTPATIENT)
Age: 54
End: 2024-10-21

## 2024-10-21 ENCOUNTER — OUTPATIENT (OUTPATIENT)
Dept: OUTPATIENT SERVICES | Facility: HOSPITAL | Age: 54
LOS: 1 days | End: 2024-10-21
Payer: MEDICAID

## 2024-10-21 ENCOUNTER — APPOINTMENT (OUTPATIENT)
Dept: INTERNAL MEDICINE | Facility: CLINIC | Age: 54
End: 2024-10-21
Payer: MEDICAID

## 2024-10-21 VITALS
HEIGHT: 71 IN | WEIGHT: 228 LBS | BODY MASS INDEX: 31.92 KG/M2 | OXYGEN SATURATION: 96 % | HEART RATE: 83 BPM | SYSTOLIC BLOOD PRESSURE: 122 MMHG | DIASTOLIC BLOOD PRESSURE: 62 MMHG

## 2024-10-21 DIAGNOSIS — Z00.00 ENCOUNTER FOR GENERAL ADULT MEDICAL EXAMINATION W/OUT ABNORMAL FINDINGS: ICD-10-CM

## 2024-10-21 DIAGNOSIS — G51.0 BELL'S PALSY: ICD-10-CM

## 2024-10-21 DIAGNOSIS — Z98.89 OTHER SPECIFIED POSTPROCEDURAL STATES: Chronic | ICD-10-CM

## 2024-10-21 DIAGNOSIS — F17.210 NICOTINE DEPENDENCE, CIGARETTES, UNCOMPLICATED: ICD-10-CM

## 2024-10-21 DIAGNOSIS — I10 ESSENTIAL (PRIMARY) HYPERTENSION: ICD-10-CM

## 2024-10-21 DIAGNOSIS — I25.10 ATHEROSCLEROTIC HEART DISEASE OF NATIVE CORONARY ARTERY W/OUT ANGINA PECTORIS: ICD-10-CM

## 2024-10-21 DIAGNOSIS — E78.5 HYPERLIPIDEMIA, UNSPECIFIED: ICD-10-CM

## 2024-10-21 PROCEDURE — 99396 PREV VISIT EST AGE 40-64: CPT | Mod: 25,GC

## 2024-10-21 PROCEDURE — 90472 IMMUNIZATION ADMIN EACH ADD: CPT

## 2024-10-21 PROCEDURE — 90656 IIV3 VACC NO PRSV 0.5 ML IM: CPT

## 2024-10-21 PROCEDURE — G0008: CPT

## 2024-10-21 PROCEDURE — 80061 LIPID PANEL: CPT

## 2024-10-21 PROCEDURE — 90715 TDAP VACCINE 7 YRS/> IM: CPT

## 2024-10-21 PROCEDURE — 83036 HEMOGLOBIN GLYCOSYLATED A1C: CPT

## 2024-10-21 PROCEDURE — G0463: CPT | Mod: 25

## 2024-10-22 RX ORDER — OLANZAPINE 5 MG/1
5 TABLET ORAL DAILY
Refills: 0 | Status: ACTIVE | COMMUNITY
Start: 2024-10-22

## 2024-10-25 LAB
CHOLEST SERPL-MCNC: 90 MG/DL
ESTIMATED AVERAGE GLUCOSE: 128 MG/DL
HBA1C MFR BLD HPLC: 6.1 %
HDLC SERPL-MCNC: 26 MG/DL
LDLC SERPL CALC-MCNC: 39 MG/DL
NONHDLC SERPL-MCNC: 63 MG/DL
TRIGL SERPL-MCNC: 141 MG/DL

## 2024-10-28 DIAGNOSIS — Z23 ENCOUNTER FOR IMMUNIZATION: ICD-10-CM

## 2024-10-28 DIAGNOSIS — F17.210 NICOTINE DEPENDENCE, CIGARETTES, UNCOMPLICATED: ICD-10-CM

## 2024-10-28 DIAGNOSIS — Z00.00 ENCOUNTER FOR GENERAL ADULT MEDICAL EXAMINATION WITHOUT ABNORMAL FINDINGS: ICD-10-CM

## 2024-10-28 DIAGNOSIS — E78.5 HYPERLIPIDEMIA, UNSPECIFIED: ICD-10-CM

## 2024-10-28 DIAGNOSIS — G51.0 BELL'S PALSY: ICD-10-CM

## 2024-10-28 DIAGNOSIS — I25.10 ATHEROSCLEROTIC HEART DISEASE OF NATIVE CORONARY ARTERY WITHOUT ANGINA PECTORIS: ICD-10-CM

## 2024-11-22 ENCOUNTER — EMERGENCY (EMERGENCY)
Facility: HOSPITAL | Age: 54
LOS: 1 days | Discharge: HOME | End: 2024-11-22
Attending: EMERGENCY MEDICINE
Payer: MEDICAID

## 2024-11-22 VITALS
HEART RATE: 77 BPM | WEIGHT: 225.09 LBS | DIASTOLIC BLOOD PRESSURE: 88 MMHG | RESPIRATION RATE: 20 BRPM | OXYGEN SATURATION: 94 % | SYSTOLIC BLOOD PRESSURE: 151 MMHG | TEMPERATURE: 98 F

## 2024-11-22 DIAGNOSIS — Z98.89 OTHER SPECIFIED POSTPROCEDURAL STATES: Chronic | ICD-10-CM

## 2024-11-22 PROCEDURE — 99283 EMERGENCY DEPT VISIT LOW MDM: CPT | Mod: 25

## 2024-11-22 PROCEDURE — 99282 EMERGENCY DEPT VISIT SF MDM: CPT

## 2024-11-23 VITALS
DIASTOLIC BLOOD PRESSURE: 76 MMHG | HEART RATE: 78 BPM | SYSTOLIC BLOOD PRESSURE: 152 MMHG | RESPIRATION RATE: 18 BRPM | TEMPERATURE: 98 F | OXYGEN SATURATION: 94 %

## 2024-11-23 NOTE — ED PROVIDER NOTE - NSFOLLOWUPINSTRUCTIONS_ED_ALL_ED_FT
You were seen in the ED today for bleeding from left ear.     Your symptoms are likely due to trauma to ear from Q-tip.     Please follow up with your ENT within the next 1 week.     A rapid referral was made for you to see a ENT. You should receive a phone call in the next 2-3 business days with the appointment information.     If you experience any of the following please return to the ED:  - Chest pain  - Trouble breathing  - Fevers or chills  - Increased bleeding  - Pain to the area

## 2024-11-23 NOTE — ED PROVIDER NOTE - NSFOLLOWUPCLINICS_GEN_ALL_ED_FT
[facility]  Otolaryngology (ENT)  [address], [address]  [place] [address]  Phone: [phone]  Fax:     [facility] (ENT)  [address] 10[facility] [place] [address]  Phone: [phone]  Fax:

## 2024-11-23 NOTE — ED PROVIDER NOTE - PHYSICAL EXAMINATION
Constitutional: VS reviewed. Alert and orientedx3, well appearing, no apparent distress  HEENT: Atraumatic, EOMI, PERRL, + soft tissue mass with some blood in EAC without active bleeding, Unable to visualize TM.   CV: RRR  Lungs: Clear and equal bilaterally, no wheezes, rales or crackles  Abdomen: Soft, nondistended, nontender  MSK: No deformities  Skin: Warm and dry. As visualized no rashes, lesions, bruising or erythema  Neuro: Strength and sensation intact.   Lymph: No pitting edema in extremities.

## 2024-11-23 NOTE — ED ADULT NURSE NOTE - OBJECTIVE STATEMENT
Patient is a 54 year old male complaining of hearing change. Patient reports earlier in the night his left ear started to bleed rand      left ear bleeding + hearing loss that started tonight Patient is a 54 year old male complaining of hearing change. Patient reports earlier in the night his left ear started to bleed and his hearing was off. On assessment patient is A&Ox3, breathing comfortably on room air, no accessory muscle use, no cough, chest rise and fall equal, Patient is a 54 year old male complaining of hearing change. Patient reports earlier in the night his left ear started to bleed and his hearing was off. On assessment patient is A&Ox4, breathing comfortably on room air, no accessory muscle use, no cough, chest rise and fall equal, PERRL, sensory grossly intact, able to move all extremities well, bilateral equal hand grasp, clear speech, dry blood noted on outside of left ear, Patient denies headache, dizziness, chest pain, palpitations, cough, SOB, abdominal pain, n/v/d, urinary symptoms, fevers, chills, weakness at this time. PMH HTN, HLD.

## 2024-11-23 NOTE — ED PROVIDER NOTE - OBJECTIVE STATEMENT
54-year-old male with past medical history of HTN, HLD, anxiety/depression presents emergency department for bleeding from the left ear.  Patient states he was watching TV when he started noticing blood coming from the left ear without any associated pain.  Patient states he put a Q-tip in the ears after showering earlier today.  Patient endorses muffled hearing on that left side as well.  Denies any other known trauma to the area.  Denies headache, vision changes, dizziness, lightheadedness, chest pain, trouble breathing, abdominal pain, nausea/vomiting.

## 2024-11-23 NOTE — ED PROVIDER NOTE - PATIENT PORTAL LINK FT
You can access the FollowMyHealth Patient Portal offered by [facility] by registering at the following website: [email or web address]. By joining [employer]’s FollowMyHealth portal, you will also be able to view your health information using other applications (apps) compatible with our system.

## 2024-11-23 NOTE — ED PROVIDER NOTE - CLINICAL SUMMARY MEDICAL DECISION MAKING FREE TEXT BOX
54-year-old male with past medical history of HTN, HLD, anxiety/depression presents emergency department for bleeding from the left ear. Patient used a Q-tip in both ears prior to that.  Patient endorsing muffled hearing on the left side.  Soft tissue debris/mass with blood in left EAC without any active bleeding.  Unclear significance.  Possible trauma versus soft tissue mass.  Will provide reassurance.  No intervention needed at this time.  Will DC patient home with expedited ENT follow-up. 54-year-old male with past medical history of HTN, HLD, anxiety/depression presents emergency department for bleeding from the left ear. Patient used a Q-tip in both ears prior to that.  Patient endorsing muffled hearing on the left side.  Soft tissue debris/mass with blood in left EAC without any active bleeding.  Unclear significance.  Possible trauma versus soft tissue mass.  Will provide reassurance.  No intervention needed at this time.  Will DC patient home with expedited ENT follow-up.    Dr. Schwartz (Attending Physician)

## 2025-04-15 ENCOUNTER — APPOINTMENT (OUTPATIENT)
Dept: NEUROLOGY | Facility: CLINIC | Age: 55
End: 2025-04-15

## 2025-04-22 ENCOUNTER — APPOINTMENT (OUTPATIENT)
Age: 55
End: 2025-04-22

## 2025-04-22 ENCOUNTER — APPOINTMENT (OUTPATIENT)
Age: 55
End: 2025-04-22
Payer: MEDICAID

## 2025-04-22 PROCEDURE — ZZZZZ: CPT

## 2025-04-22 PROCEDURE — D0330 PANORAMIC RADIOGRAPHIC IMAGE: CPT

## 2025-04-22 PROCEDURE — D0230: CPT

## 2025-04-22 PROCEDURE — D0150: CPT

## 2025-04-22 PROCEDURE — SCREENING: CUSTOM

## 2025-04-22 PROCEDURE — D0274: CPT

## 2025-04-22 PROCEDURE — D1110 PROPHYLAXIS - ADULT: CPT

## 2025-04-22 PROCEDURE — D0220: CPT

## 2025-05-06 ENCOUNTER — APPOINTMENT (OUTPATIENT)
Age: 55
End: 2025-05-06

## 2025-05-19 ENCOUNTER — APPOINTMENT (OUTPATIENT)
Dept: INTERNAL MEDICINE | Facility: CLINIC | Age: 55
End: 2025-05-19

## 2025-05-28 ENCOUNTER — APPOINTMENT (OUTPATIENT)
Age: 55
End: 2025-05-28

## 2025-05-28 PROCEDURE — PIP: CUSTOM

## 2025-06-17 ENCOUNTER — APPOINTMENT (OUTPATIENT)
Age: 55
End: 2025-06-17
Payer: MEDICAID

## 2025-06-17 PROCEDURE — D2750: CPT

## 2025-07-08 ENCOUNTER — APPOINTMENT (OUTPATIENT)
Age: 55
End: 2025-07-08
Payer: MEDICAID

## 2025-07-08 PROCEDURE — INCR: CUSTOM

## 2025-08-26 ENCOUNTER — OUTPATIENT (OUTPATIENT)
Dept: OUTPATIENT SERVICES | Facility: HOSPITAL | Age: 55
LOS: 1 days | End: 2025-08-26
Payer: MEDICAID

## 2025-08-26 ENCOUNTER — APPOINTMENT (OUTPATIENT)
Dept: CARDIOLOGY | Facility: HOSPITAL | Age: 55
End: 2025-08-26

## 2025-08-26 VITALS
BODY MASS INDEX: 30.68 KG/M2 | OXYGEN SATURATION: 95 % | WEIGHT: 220 LBS | DIASTOLIC BLOOD PRESSURE: 89 MMHG | SYSTOLIC BLOOD PRESSURE: 132 MMHG | HEART RATE: 67 BPM

## 2025-08-26 DIAGNOSIS — I25.10 ATHEROSCLEROTIC HEART DISEASE OF NATIVE CORONARY ARTERY WITHOUT ANGINA PECTORIS: ICD-10-CM

## 2025-08-26 DIAGNOSIS — I25.10 ATHEROSCLEROTIC HEART DISEASE OF NATIVE CORONARY ARTERY W/OUT ANGINA PECTORIS: ICD-10-CM

## 2025-08-26 DIAGNOSIS — Z98.89 OTHER SPECIFIED POSTPROCEDURAL STATES: Chronic | ICD-10-CM

## 2025-08-26 PROCEDURE — G0463: CPT

## 2025-08-26 PROCEDURE — 93005 ELECTROCARDIOGRAM TRACING: CPT

## 2025-08-27 LAB
ANION GAP SERPL CALC-SCNC: 13 MMOL/L
BUN SERPL-MCNC: 13 MG/DL
CALCIUM SERPL-MCNC: 9.1 MG/DL
CHLORIDE SERPL-SCNC: 103 MMOL/L
CHOLEST SERPL-MCNC: 110 MG/DL
CO2 SERPL-SCNC: 24 MMOL/L
CREAT SERPL-MCNC: 0.96 MG/DL
EGFRCR SERPLBLD CKD-EPI 2021: 92 ML/MIN/1.73M2
ESTIMATED AVERAGE GLUCOSE: 131 MG/DL
GLUCOSE SERPL-MCNC: 77 MG/DL
HBA1C MFR BLD HPLC: 6.2 %
HDLC SERPL-MCNC: 31 MG/DL
LDLC SERPL-MCNC: 57 MG/DL
NONHDLC SERPL-MCNC: 79 MG/DL
POTASSIUM SERPL-SCNC: 4.6 MMOL/L
SODIUM SERPL-SCNC: 140 MMOL/L
TRIGL SERPL-MCNC: 123 MG/DL

## 2025-09-03 ENCOUNTER — APPOINTMENT (OUTPATIENT)
Dept: ULTRASOUND IMAGING | Facility: CLINIC | Age: 55
End: 2025-09-03
Payer: MEDICAID

## 2025-09-03 ENCOUNTER — OUTPATIENT (OUTPATIENT)
Dept: OUTPATIENT SERVICES | Facility: HOSPITAL | Age: 55
LOS: 1 days | End: 2025-09-03
Payer: MEDICAID

## 2025-09-03 ENCOUNTER — RESULT REVIEW (OUTPATIENT)
Age: 55
End: 2025-09-03

## 2025-09-03 DIAGNOSIS — Z98.89 OTHER SPECIFIED POSTPROCEDURAL STATES: Chronic | ICD-10-CM

## 2025-09-03 DIAGNOSIS — I10 ESSENTIAL (PRIMARY) HYPERTENSION: ICD-10-CM

## 2025-09-03 DIAGNOSIS — Z00.8 ENCOUNTER FOR OTHER GENERAL EXAMINATION: ICD-10-CM

## 2025-09-03 PROCEDURE — 93880 EXTRACRANIAL BILAT STUDY: CPT | Mod: 26

## 2025-09-03 PROCEDURE — 76775 US EXAM ABDO BACK WALL LIM: CPT

## 2025-09-03 PROCEDURE — 76775 US EXAM ABDO BACK WALL LIM: CPT | Mod: 26

## 2025-09-03 PROCEDURE — 93880 EXTRACRANIAL BILAT STUDY: CPT
